# Patient Record
Sex: FEMALE | Race: WHITE | Employment: OTHER | URBAN - METROPOLITAN AREA
[De-identification: names, ages, dates, MRNs, and addresses within clinical notes are randomized per-mention and may not be internally consistent; named-entity substitution may affect disease eponyms.]

---

## 2017-01-22 PROBLEM — R07.9 CHEST PAIN: Status: ACTIVE | Noted: 2017-01-22

## 2017-01-22 PROBLEM — E87.6 HYPOKALEMIA: Status: ACTIVE | Noted: 2017-01-22

## 2017-01-22 PROBLEM — I44.2 COMPLETE HEART BLOCK (HCC): Status: ACTIVE | Noted: 2017-01-22

## 2017-05-09 PROBLEM — R07.9 CHEST PAIN: Status: RESOLVED | Noted: 2017-01-22 | Resolved: 2017-05-09

## 2017-05-09 PROBLEM — E87.6 HYPOKALEMIA: Status: RESOLVED | Noted: 2017-01-22 | Resolved: 2017-05-09

## 2017-08-24 PROBLEM — Z95.0 PACEMAKER: Status: ACTIVE | Noted: 2017-08-24

## 2017-12-07 PROBLEM — Z95.0 PACEMAKER-DEPENDENT DUE TO NATIVE CARDIAC RHYTHM INSUFFICIENT TO SUPPORT LIFE: Status: ACTIVE | Noted: 2017-12-07

## 2017-12-07 PROBLEM — I49.8 PACEMAKER-DEPENDENT DUE TO NATIVE CARDIAC RHYTHM INSUFFICIENT TO SUPPORT LIFE: Status: ACTIVE | Noted: 2017-12-07

## 2018-03-14 ENCOUNTER — NURSE ONLY (OUTPATIENT)
Dept: NON INVASIVE DIAGNOSTICS | Age: 78
End: 2018-03-14
Payer: COMMERCIAL

## 2018-03-14 DIAGNOSIS — I97.89 POSTOPERATIVE ATRIAL FIBRILLATION (HCC): ICD-10-CM

## 2018-03-14 DIAGNOSIS — Z95.0 PACEMAKER: Primary | ICD-10-CM

## 2018-03-14 DIAGNOSIS — I48.91 POSTOPERATIVE ATRIAL FIBRILLATION (HCC): ICD-10-CM

## 2018-03-14 DIAGNOSIS — I44.30 POSTOPERATIVE AV BLOCK: ICD-10-CM

## 2018-03-14 DIAGNOSIS — I97.89 POSTOPERATIVE AV BLOCK: ICD-10-CM

## 2018-03-14 PROCEDURE — 93296 REM INTERROG EVL PM/IDS: CPT | Performed by: INTERNAL MEDICINE

## 2018-03-14 PROCEDURE — 93294 REM INTERROG EVL PM/LDLS PM: CPT | Performed by: INTERNAL MEDICINE

## 2018-03-27 ENCOUNTER — TELEPHONE (OUTPATIENT)
Dept: NON INVASIVE DIAGNOSTICS | Age: 78
End: 2018-03-27

## 2018-03-27 NOTE — TELEPHONE ENCOUNTER
----- Message from Angelica Don RN sent at 3/27/2018 10:43 AM EDT -----  Successful transmission received. Please call patient and give next appointment.

## 2018-04-17 ENCOUNTER — TELEPHONE (OUTPATIENT)
Dept: CARDIOLOGY CLINIC | Age: 78
End: 2018-04-17

## 2018-07-05 ENCOUNTER — HOSPITAL ENCOUNTER (OUTPATIENT)
Age: 78
Discharge: HOME | End: 2018-07-05
Payer: COMMERCIAL

## 2018-07-05 LAB
ALBUMIN SERPL-MCNC: 4.1 G/DL (ref 3.5–5.2)
ALP BLD-CCNC: 83 U/L (ref 35–104)
ALT SERPL-CCNC: 23 U/L (ref 0–32)
ANION GAP SERPL CALCULATED.3IONS-SCNC: 12 MMOL/L (ref 7–16)
AST SERPL-CCNC: 24 U/L (ref 0–31)
BASOPHILS ABSOLUTE: 0.04 E9/L (ref 0–0.2)
BASOPHILS RELATIVE PERCENT: 0.5 % (ref 0–2)
BILIRUB SERPL-MCNC: 0.5 MG/DL (ref 0–1.2)
BUN BLDV-MCNC: 28 MG/DL (ref 8–23)
CALCIUM SERPL-MCNC: 9 MG/DL (ref 8.6–10.2)
CHLORIDE BLD-SCNC: 100 MMOL/L (ref 98–107)
CHOLESTEROL, FASTING: 167 MG/DL (ref 0–199)
CO2: 30 MMOL/L (ref 22–29)
CREAT SERPL-MCNC: 1 MG/DL (ref 0.5–1)
EOSINOPHILS ABSOLUTE: 0.33 E9/L (ref 0.05–0.5)
EOSINOPHILS RELATIVE PERCENT: 3.9 % (ref 0–6)
GFR AFRICAN AMERICAN: >60
GFR NON-AFRICAN AMERICAN: 54 ML/MIN/1.73
GLUCOSE FASTING: 88 MG/DL (ref 74–109)
HBA1C MFR BLD: 6.9 % (ref 4–5.6)
HCT VFR BLD CALC: 41 % (ref 34–48)
HDLC SERPL-MCNC: 50 MG/DL
HEMOGLOBIN: 14 G/DL (ref 11.5–15.5)
IMMATURE GRANULOCYTES #: 0.01 E9/L
IMMATURE GRANULOCYTES %: 0.1 % (ref 0–5)
LDL CHOLESTEROL CALCULATED: 97 MG/DL (ref 0–99)
LYMPHOCYTES ABSOLUTE: 2.41 E9/L (ref 1.5–4)
LYMPHOCYTES RELATIVE PERCENT: 28.6 % (ref 20–42)
MCH RBC QN AUTO: 29.8 PG (ref 26–35)
MCHC RBC AUTO-ENTMCNC: 34.1 % (ref 32–34.5)
MCV RBC AUTO: 87.2 FL (ref 80–99.9)
MONOCYTES ABSOLUTE: 0.57 E9/L (ref 0.1–0.95)
MONOCYTES RELATIVE PERCENT: 6.8 % (ref 2–12)
NEUTROPHILS ABSOLUTE: 5.08 E9/L (ref 1.8–7.3)
NEUTROPHILS RELATIVE PERCENT: 60.1 % (ref 43–80)
PDW BLD-RTO: 12.6 FL (ref 11.5–15)
PLATELET # BLD: 166 E9/L (ref 130–450)
PMV BLD AUTO: 9.4 FL (ref 7–12)
POTASSIUM SERPL-SCNC: 3.6 MMOL/L (ref 3.5–5)
RBC # BLD: 4.7 E12/L (ref 3.5–5.5)
SODIUM BLD-SCNC: 142 MMOL/L (ref 132–146)
TOTAL PROTEIN: 6.9 G/DL (ref 6.4–8.3)
TRIGLYCERIDE, FASTING: 101 MG/DL (ref 0–149)
TSH SERPL DL<=0.05 MIU/L-ACNC: 0.71 UIU/ML (ref 0.27–4.2)
VITAMIN D 25-HYDROXY: 30 NG/ML (ref 30–100)
VLDLC SERPL CALC-MCNC: 20 MG/DL
WBC # BLD: 8.4 E9/L (ref 4.5–11.5)

## 2018-07-05 PROCEDURE — 80053 COMPREHEN METABOLIC PANEL: CPT

## 2018-07-05 PROCEDURE — 84443 ASSAY THYROID STIM HORMONE: CPT

## 2018-07-05 PROCEDURE — 82306 VITAMIN D 25 HYDROXY: CPT

## 2018-07-05 PROCEDURE — 83036 HEMOGLOBIN GLYCOSYLATED A1C: CPT

## 2018-07-05 PROCEDURE — 85025 COMPLETE CBC W/AUTO DIFF WBC: CPT

## 2018-07-05 PROCEDURE — 36415 COLL VENOUS BLD VENIPUNCTURE: CPT

## 2018-07-05 PROCEDURE — 80061 LIPID PANEL: CPT

## 2018-07-16 ENCOUNTER — TELEPHONE (OUTPATIENT)
Dept: NON INVASIVE DIAGNOSTICS | Age: 78
End: 2018-07-16

## 2018-07-20 ENCOUNTER — NURSE ONLY (OUTPATIENT)
Dept: NON INVASIVE DIAGNOSTICS | Age: 78
End: 2018-07-20
Payer: COMMERCIAL

## 2018-07-20 DIAGNOSIS — Z95.0 PACEMAKER: Primary | ICD-10-CM

## 2018-07-20 DIAGNOSIS — I44.2 COMPLETE HEART BLOCK (HCC): ICD-10-CM

## 2018-07-20 DIAGNOSIS — I48.0 PAROXYSMAL ATRIAL FIBRILLATION (HCC): ICD-10-CM

## 2018-07-20 PROCEDURE — 93296 REM INTERROG EVL PM/IDS: CPT | Performed by: INTERNAL MEDICINE

## 2018-07-20 PROCEDURE — 93294 REM INTERROG EVL PM/LDLS PM: CPT | Performed by: INTERNAL MEDICINE

## 2018-07-20 NOTE — TELEPHONE ENCOUNTER
Patient called questioning if her transmission came through. Instructed successful latitude transmission. Patient instructed to keep monitor plugged in. Patient voiced understanding.      Gilberto Salgado

## 2018-07-20 NOTE — PROGRESS NOTES
I have personally reviewed the remote pacemaker data. Stable battery and lead parameters.   - No clinically significant arrhythmias noted  - continue follow up as recommended    Marietta Monk MD, [address]  [facility]  [facility] [place]: [facility]  4:02 PM  7/20/2018

## 2018-09-20 ENCOUNTER — OFFICE VISIT (OUTPATIENT)
Dept: CARDIOLOGY CLINIC | Age: 78
End: 2018-09-20
Payer: COMMERCIAL

## 2018-09-20 VITALS
HEART RATE: 64 BPM | WEIGHT: 193.8 LBS | BODY MASS INDEX: 35.66 KG/M2 | RESPIRATION RATE: 16 BRPM | DIASTOLIC BLOOD PRESSURE: 72 MMHG | SYSTOLIC BLOOD PRESSURE: 122 MMHG | HEIGHT: 62 IN

## 2018-09-20 DIAGNOSIS — I10 ESSENTIAL HYPERTENSION: Primary | Chronic | ICD-10-CM

## 2018-09-20 PROCEDURE — 99214 OFFICE O/P EST MOD 30 MIN: CPT | Performed by: INTERNAL MEDICINE

## 2018-09-20 PROCEDURE — 93000 ELECTROCARDIOGRAM COMPLETE: CPT | Performed by: INTERNAL MEDICINE

## 2018-09-24 RX ORDER — METOPROLOL SUCCINATE 50 MG/1
TABLET, EXTENDED RELEASE ORAL
Qty: 180 TABLET | Refills: 1 | Status: SHIPPED | OUTPATIENT
Start: 2018-09-24 | End: 2019-03-20 | Stop reason: SDUPTHER

## 2018-10-24 ENCOUNTER — NURSE ONLY (OUTPATIENT)
Dept: NON INVASIVE DIAGNOSTICS | Age: 78
End: 2018-10-24
Payer: COMMERCIAL

## 2018-10-24 DIAGNOSIS — Z95.0 PACEMAKER: Primary | ICD-10-CM

## 2018-10-24 DIAGNOSIS — I44.2 COMPLETE HEART BLOCK (HCC): ICD-10-CM

## 2018-10-24 PROCEDURE — 93296 REM INTERROG EVL PM/IDS: CPT | Performed by: INTERNAL MEDICINE

## 2018-10-24 PROCEDURE — 93294 REM INTERROG EVL PM/LDLS PM: CPT | Performed by: INTERNAL MEDICINE

## 2018-10-29 ENCOUNTER — TELEPHONE (OUTPATIENT)
Dept: ADMINISTRATIVE | Age: 78
End: 2018-10-29

## 2018-10-29 ENCOUNTER — TELEPHONE (OUTPATIENT)
Dept: NON INVASIVE DIAGNOSTICS | Age: 78
End: 2018-10-29

## 2018-10-29 NOTE — TELEPHONE ENCOUNTER
Pt notified of remote transmission receipt and of next apt with Dr. Sherita Qureshi of: 12/18/18 @ 1:15 for a yearly OV with Dr. Sherita Qureshi. She states understanding.

## 2018-10-29 NOTE — TELEPHONE ENCOUNTER
----- Message from Mai Catalan RN sent at 10/26/2018  3:56 PM EDT -----  Successful transmission received. Please call patient and give next appointment.

## 2018-11-16 ENCOUNTER — HOSPITAL ENCOUNTER (OUTPATIENT)
Age: 78
Discharge: HOME | End: 2018-11-16
Payer: COMMERCIAL

## 2018-11-16 LAB
ALBUMIN SERPL-MCNC: 4.1 G/DL (ref 3.5–5.2)
ALP BLD-CCNC: 90 U/L (ref 35–104)
ALT SERPL-CCNC: 26 U/L (ref 0–32)
ANION GAP SERPL CALCULATED.3IONS-SCNC: 14 MMOL/L (ref 7–16)
AST SERPL-CCNC: 22 U/L (ref 0–31)
BASOPHILS ABSOLUTE: 0 E9/L (ref 0–0.2)
BASOPHILS RELATIVE PERCENT: 0 % (ref 0–2)
BILIRUB SERPL-MCNC: 0.4 MG/DL (ref 0–1.2)
BUN BLDV-MCNC: 21 MG/DL (ref 8–23)
CALCIUM SERPL-MCNC: 9.4 MG/DL (ref 8.6–10.2)
CHLORIDE BLD-SCNC: 101 MMOL/L (ref 98–107)
CHOLESTEROL, TOTAL: 194 MG/DL (ref 0–199)
CO2: 31 MMOL/L (ref 22–29)
CREAT SERPL-MCNC: 1 MG/DL (ref 0.5–1)
EOSINOPHILS ABSOLUTE: 0.25 E9/L (ref 0.05–0.5)
EOSINOPHILS RELATIVE PERCENT: 4.3 % (ref 0–6)
GFR AFRICAN AMERICAN: >60
GFR NON-AFRICAN AMERICAN: 54 ML/MIN/1.73
GLUCOSE BLD-MCNC: 129 MG/DL (ref 74–99)
HBA1C MFR BLD: 7.3 % (ref 4–5.6)
HCT VFR BLD CALC: 42.1 % (ref 34–48)
HDLC SERPL-MCNC: 49 MG/DL
HEMOGLOBIN: 14 G/DL (ref 11.5–15.5)
IMMATURE GRANULOCYTES #: 0.01 E9/L
IMMATURE GRANULOCYTES %: 0.2 % (ref 0–5)
LDL CHOLESTEROL CALCULATED: 117 MG/DL (ref 0–99)
LYMPHOCYTES ABSOLUTE: 1.74 E9/L (ref 1.5–4)
LYMPHOCYTES RELATIVE PERCENT: 29.6 % (ref 20–42)
MCH RBC QN AUTO: 29.2 PG (ref 26–35)
MCHC RBC AUTO-ENTMCNC: 33.3 % (ref 32–34.5)
MCV RBC AUTO: 87.9 FL (ref 80–99.9)
MONOCYTES ABSOLUTE: 0.38 E9/L (ref 0.1–0.95)
MONOCYTES RELATIVE PERCENT: 6.5 % (ref 2–12)
NEUTROPHILS ABSOLUTE: 3.5 E9/L (ref 1.8–7.3)
NEUTROPHILS RELATIVE PERCENT: 59.4 % (ref 43–80)
PDW BLD-RTO: 12.6 FL (ref 11.5–15)
PLATELET # BLD: 156 E9/L (ref 130–450)
PMV BLD AUTO: 9.3 FL (ref 7–12)
POTASSIUM SERPL-SCNC: 4 MMOL/L (ref 3.5–5)
RBC # BLD: 4.79 E12/L (ref 3.5–5.5)
SODIUM BLD-SCNC: 146 MMOL/L (ref 132–146)
TOTAL PROTEIN: 7 G/DL (ref 6.4–8.3)
TRIGL SERPL-MCNC: 141 MG/DL (ref 0–149)
TSH SERPL DL<=0.05 MIU/L-ACNC: 3.25 UIU/ML (ref 0.27–4.2)
VLDLC SERPL CALC-MCNC: 28 MG/DL
WBC # BLD: 5.9 E9/L (ref 4.5–11.5)

## 2018-11-16 PROCEDURE — 36415 COLL VENOUS BLD VENIPUNCTURE: CPT

## 2018-11-16 PROCEDURE — 84443 ASSAY THYROID STIM HORMONE: CPT

## 2018-11-16 PROCEDURE — 83036 HEMOGLOBIN GLYCOSYLATED A1C: CPT

## 2018-11-16 PROCEDURE — 80053 COMPREHEN METABOLIC PANEL: CPT

## 2018-11-16 PROCEDURE — 85025 COMPLETE CBC W/AUTO DIFF WBC: CPT

## 2018-11-16 PROCEDURE — 80061 LIPID PANEL: CPT

## 2018-12-18 ENCOUNTER — OFFICE VISIT (OUTPATIENT)
Dept: NON INVASIVE DIAGNOSTICS | Age: 78
End: 2018-12-18
Payer: COMMERCIAL

## 2018-12-18 VITALS
WEIGHT: 196 LBS | HEIGHT: 62 IN | RESPIRATION RATE: 18 BRPM | HEART RATE: 79 BPM | DIASTOLIC BLOOD PRESSURE: 80 MMHG | SYSTOLIC BLOOD PRESSURE: 124 MMHG | BODY MASS INDEX: 36.07 KG/M2

## 2018-12-18 DIAGNOSIS — G47.33 OSA ON CPAP: ICD-10-CM

## 2018-12-18 DIAGNOSIS — I49.8 PACEMAKER-DEPENDENT DUE TO NATIVE CARDIAC RHYTHM INSUFFICIENT TO SUPPORT LIFE: ICD-10-CM

## 2018-12-18 DIAGNOSIS — Z99.89 OSA ON CPAP: ICD-10-CM

## 2018-12-18 DIAGNOSIS — I44.2 COMPLETE HEART BLOCK (HCC): ICD-10-CM

## 2018-12-18 DIAGNOSIS — Z95.0 PACEMAKER-DEPENDENT DUE TO NATIVE CARDIAC RHYTHM INSUFFICIENT TO SUPPORT LIFE: ICD-10-CM

## 2018-12-18 DIAGNOSIS — Z95.0 PACEMAKER: Primary | ICD-10-CM

## 2018-12-18 PROCEDURE — 93280 PM DEVICE PROGR EVAL DUAL: CPT | Performed by: INTERNAL MEDICINE

## 2018-12-18 PROCEDURE — 99212 OFFICE O/P EST SF 10 MIN: CPT | Performed by: INTERNAL MEDICINE

## 2019-01-23 ENCOUNTER — NURSE ONLY (OUTPATIENT)
Dept: NON INVASIVE DIAGNOSTICS | Age: 79
End: 2019-01-23
Payer: COMMERCIAL

## 2019-01-23 DIAGNOSIS — I44.2 COMPLETE HEART BLOCK (HCC): ICD-10-CM

## 2019-01-23 DIAGNOSIS — I48.0 PAROXYSMAL ATRIAL FIBRILLATION (HCC): ICD-10-CM

## 2019-01-23 DIAGNOSIS — Z95.0 PACEMAKER: Primary | ICD-10-CM

## 2019-01-23 PROCEDURE — 93296 REM INTERROG EVL PM/IDS: CPT | Performed by: INTERNAL MEDICINE

## 2019-01-23 PROCEDURE — 93294 REM INTERROG EVL PM/LDLS PM: CPT | Performed by: INTERNAL MEDICINE

## 2019-02-22 ENCOUNTER — TELEPHONE (OUTPATIENT)
Dept: ADMINISTRATIVE | Age: 79
End: 2019-02-22

## 2019-02-28 RX ORDER — ISOSORBIDE MONONITRATE 30 MG/1
TABLET, EXTENDED RELEASE ORAL
Qty: 90 TABLET | Refills: 3 | Status: SHIPPED
Start: 2019-02-28 | End: 2020-03-02

## 2019-03-20 RX ORDER — METOPROLOL SUCCINATE 50 MG/1
TABLET, EXTENDED RELEASE ORAL
Qty: 180 TABLET | Refills: 3 | Status: SHIPPED
Start: 2019-03-20 | End: 2020-03-13

## 2019-03-28 ENCOUNTER — OFFICE VISIT (OUTPATIENT)
Dept: CARDIOLOGY CLINIC | Age: 79
End: 2019-03-28
Payer: COMMERCIAL

## 2019-03-28 VITALS
DIASTOLIC BLOOD PRESSURE: 70 MMHG | WEIGHT: 196.2 LBS | HEIGHT: 62 IN | HEART RATE: 64 BPM | SYSTOLIC BLOOD PRESSURE: 110 MMHG | BODY MASS INDEX: 36.1 KG/M2 | RESPIRATION RATE: 18 BRPM

## 2019-03-28 DIAGNOSIS — Z01.810 PREOP CARDIOVASCULAR EXAM: ICD-10-CM

## 2019-03-28 DIAGNOSIS — I25.119 CORONARY ARTERY DISEASE INVOLVING NATIVE CORONARY ARTERY OF NATIVE HEART WITH ANGINA PECTORIS (HCC): Primary | Chronic | ICD-10-CM

## 2019-03-28 PROCEDURE — 99214 OFFICE O/P EST MOD 30 MIN: CPT | Performed by: INTERNAL MEDICINE

## 2019-03-28 PROCEDURE — 93000 ELECTROCARDIOGRAM COMPLETE: CPT | Performed by: INTERNAL MEDICINE

## 2019-04-02 ENCOUNTER — HOSPITAL ENCOUNTER (OUTPATIENT)
Dept: CARDIOLOGY | Age: 79
Discharge: HOME | End: 2019-04-02
Payer: COMMERCIAL

## 2019-04-02 VITALS
HEART RATE: 82 BPM | SYSTOLIC BLOOD PRESSURE: 124 MMHG | WEIGHT: 196 LBS | BODY MASS INDEX: 36.07 KG/M2 | DIASTOLIC BLOOD PRESSURE: 70 MMHG | HEIGHT: 62 IN

## 2019-04-02 DIAGNOSIS — Z01.810 PREOP CARDIOVASCULAR EXAM: ICD-10-CM

## 2019-04-02 DIAGNOSIS — I25.119 CORONARY ARTERY DISEASE INVOLVING NATIVE CORONARY ARTERY OF NATIVE HEART WITH ANGINA PECTORIS (HCC): Primary | Chronic | ICD-10-CM

## 2019-04-02 PROCEDURE — 3430000000 HC RX DIAGNOSTIC RADIOPHARMACEUTICAL: Performed by: INTERNAL MEDICINE

## 2019-04-02 PROCEDURE — 78452 HT MUSCLE IMAGE SPECT MULT: CPT

## 2019-04-02 PROCEDURE — 6360000002 HC RX W HCPCS: Performed by: INTERNAL MEDICINE

## 2019-04-02 PROCEDURE — 93017 CV STRESS TEST TRACING ONLY: CPT

## 2019-04-02 PROCEDURE — 2580000003 HC RX 258: Performed by: INTERNAL MEDICINE

## 2019-04-02 PROCEDURE — A9500 TC99M SESTAMIBI: HCPCS | Performed by: INTERNAL MEDICINE

## 2019-04-02 RX ORDER — SODIUM CHLORIDE 0.9 % (FLUSH) 0.9 %
10 SYRINGE (ML) INJECTION PRN
Status: DISCONTINUED | OUTPATIENT
Start: 2019-04-02 | End: 2019-04-03 | Stop reason: HOSPADM

## 2019-04-02 RX ADMIN — Medication 10 ML: at 10:03

## 2019-04-02 RX ADMIN — Medication 10 ML: at 10:04

## 2019-04-02 RX ADMIN — Medication 10 ML: at 08:55

## 2019-04-02 RX ADMIN — Medication 32.6 MILLICURIE: at 10:03

## 2019-04-02 RX ADMIN — REGADENOSON 0.4 MG: 0.08 INJECTION, SOLUTION INTRAVENOUS at 10:03

## 2019-04-02 RX ADMIN — Medication 10.1 MILLICURIE: at 08:55

## 2019-04-02 NOTE — PROCEDURES
[address] [facility] [address] [phone]                Pharmacologic Stress Nuclear Gated SPECT Study    Name: Gamal Carty Account Number: [de-identified]    :  1940          Sex: female         Date of Study:  2019    Height: 5' 2\" (157.5 cm)         Weight: 196 lb (88.9 kg)     Ordering Provider: Noah Haney DO          PCP: Edy Grace MD      Cardiologist: Noah Haney DO            Interpreting Physician: Bob Rachel MD      Indication:   Preoperative Risk Stratification    Clinical History:   Patient has prior history of coronary artery disease. Resting ECG:    SC int .22 sec, QRS int .14 sec, QT int . 40 sec; HR 75 bpm  Normal sinus rhythm and Left Bundle Branch Block    Procedure:   Pharmacologic stress testing was performed with regadenoson 0.4 mg for 15 seconds. Additionally, low-level exercise was performed along with the infusion. The heart rate was 75 at baseline and donnie to 108 beats during the infusion. This corresponds to 75% of maximum predicted heart rate. The blood pressure at baseline was 120/70 and blood pressure at the end of infusion was 118/70. Blood pressure response was normal during the stress procedure. The patient experienced weird sensation and headache during the infusion. ECG during the infusion did not change. IMAGING: Myocardial perfusion imaging was performed at rest 30-35 minutes following the intravenous injection of 10.1 mCi of (Tc-Sestamibi) followed by 10 ml of Normal Saline. As per infusion protocol, the patient was injected intravenously with 32.6 mCi of (Tc-Sestamibi) followed by 10 ml of Normal Saline. Gated post-stress tomographic imaging was performed 20-25 minutes after stress. FINDINGS: The overall quality of the study was good.      Left ventricular cavity size was noted to be normal.    Rotational analog analysis demonstrated no patient motion or abnormal extracardiac radioactivity. A severe defect was present in the apical anterior wall(s) that was  small sized by quantification. The resting images show no change. Gated SPECT left ventricular ejection fraction was calculated to be 77%, with hypokinesis of the apical anterior wall. Impression:    1. ECG during the infusion did not change. 2. The myocardial perfusion imaging was abnormal.  The abnormality was a a small sized fixed defect in the apical anterior wall suggestive of a prior MI  3. Overall left ventricular systolic function was abnormal with regional wall motion abnormalities. 4. Low risk pre-operative pharmacologic stress test.    Thank you for sending your patient to this [employer] [facility].      Electronically signed by Shai Hall MD on 4/2/19 at 11:46 AM

## 2019-04-03 ENCOUNTER — HOSPITAL ENCOUNTER (OUTPATIENT)
Age: 79
Discharge: HOME | End: 2019-04-03
Payer: COMMERCIAL

## 2019-04-03 LAB
ALBUMIN SERPL-MCNC: 4.3 G/DL (ref 3.5–5.2)
ALP BLD-CCNC: 104 U/L (ref 35–104)
ALT SERPL-CCNC: 25 U/L (ref 0–32)
ANION GAP SERPL CALCULATED.3IONS-SCNC: 13 MMOL/L (ref 7–16)
AST SERPL-CCNC: 21 U/L (ref 0–31)
BASOPHILS ABSOLUTE: 0 E9/L (ref 0–0.2)
BASOPHILS RELATIVE PERCENT: 0 % (ref 0–2)
BILIRUB SERPL-MCNC: 0.5 MG/DL (ref 0–1.2)
BUN BLDV-MCNC: 23 MG/DL (ref 8–23)
CALCIUM SERPL-MCNC: 9 MG/DL (ref 8.6–10.2)
CHLORIDE BLD-SCNC: 101 MMOL/L (ref 98–107)
CHOLESTEROL, TOTAL: 165 MG/DL (ref 0–199)
CO2: 30 MMOL/L (ref 22–29)
CREAT SERPL-MCNC: 0.9 MG/DL (ref 0.5–1)
EOSINOPHILS ABSOLUTE: 0.24 E9/L (ref 0.05–0.5)
EOSINOPHILS RELATIVE PERCENT: 3.8 % (ref 0–6)
GFR AFRICAN AMERICAN: >60
GFR NON-AFRICAN AMERICAN: >60 ML/MIN/1.73
GLUCOSE BLD-MCNC: 143 MG/DL (ref 74–99)
HBA1C MFR BLD: 7.3 % (ref 4–5.6)
HCT VFR BLD CALC: 42.4 % (ref 34–48)
HDLC SERPL-MCNC: 54 MG/DL
HEMOGLOBIN: 14.3 G/DL (ref 11.5–15.5)
IMMATURE GRANULOCYTES #: 0.01 E9/L
IMMATURE GRANULOCYTES %: 0.2 % (ref 0–5)
LDL CHOLESTEROL CALCULATED: 88 MG/DL (ref 0–99)
LYMPHOCYTES ABSOLUTE: 1.87 E9/L (ref 1.5–4)
LYMPHOCYTES RELATIVE PERCENT: 29.7 % (ref 20–42)
MCH RBC QN AUTO: 29.7 PG (ref 26–35)
MCHC RBC AUTO-ENTMCNC: 33.7 % (ref 32–34.5)
MCV RBC AUTO: 88 FL (ref 80–99.9)
MONOCYTES ABSOLUTE: 0.46 E9/L (ref 0.1–0.95)
MONOCYTES RELATIVE PERCENT: 7.3 % (ref 2–12)
NEUTROPHILS ABSOLUTE: 3.72 E9/L (ref 1.8–7.3)
NEUTROPHILS RELATIVE PERCENT: 59 % (ref 43–80)
PDW BLD-RTO: 12.5 FL (ref 11.5–15)
PLATELET # BLD: 158 E9/L (ref 130–450)
PMV BLD AUTO: 9.5 FL (ref 7–12)
POTASSIUM SERPL-SCNC: 3.8 MMOL/L (ref 3.5–5)
RBC # BLD: 4.82 E12/L (ref 3.5–5.5)
SODIUM BLD-SCNC: 144 MMOL/L (ref 132–146)
TOTAL PROTEIN: 7.2 G/DL (ref 6.4–8.3)
TRIGL SERPL-MCNC: 114 MG/DL (ref 0–149)
TSH SERPL DL<=0.05 MIU/L-ACNC: 1.83 UIU/ML (ref 0.27–4.2)
VLDLC SERPL CALC-MCNC: 23 MG/DL
WBC # BLD: 6.3 E9/L (ref 4.5–11.5)

## 2019-04-03 PROCEDURE — 36415 COLL VENOUS BLD VENIPUNCTURE: CPT

## 2019-04-03 PROCEDURE — 85025 COMPLETE CBC W/AUTO DIFF WBC: CPT

## 2019-04-03 PROCEDURE — 80061 LIPID PANEL: CPT

## 2019-04-03 PROCEDURE — 83036 HEMOGLOBIN GLYCOSYLATED A1C: CPT

## 2019-04-03 PROCEDURE — 84443 ASSAY THYROID STIM HORMONE: CPT

## 2019-04-03 PROCEDURE — 80053 COMPREHEN METABOLIC PANEL: CPT

## 2019-04-10 ENCOUNTER — TELEPHONE (OUTPATIENT)
Dept: CARDIOLOGY CLINIC | Age: 79
End: 2019-04-10

## 2019-04-10 NOTE — TELEPHONE ENCOUNTER
----- Message from Noe Valdovinos DO sent at 4/10/2019 12:04 PM EDT -----  Low risk stress. Noe Valdovinos D.O.   Cardiologist  Cardiology, [facility]

## 2019-04-10 NOTE — TELEPHONE ENCOUNTER
Contacted patient with stress results per Dr. Johnathon Tijerina. She acknowledged understanding. Forwarded amended note to Dr. Henrietta Flores and Dr. Ara Merritt.

## 2019-04-11 ENCOUNTER — TELEPHONE (OUTPATIENT)
Dept: NON INVASIVE DIAGNOSTICS | Age: 79
End: 2019-04-11

## 2019-04-11 NOTE — TELEPHONE ENCOUNTER
Spoke with the patient to follow up since Dr Earl Ramachandran is no longer here. Patient stated that she is feeling good and having no issues right now. Told the patient that we are going to follow all EP Medications, and devices if the patient has one. Next remote transmission is 04/25/2019. Patient's cardiologist is Dr Pauline Hardwick. Told the patient to call us if they have any urgent matters. Verbalized understanding.

## 2019-04-25 ENCOUNTER — NURSE ONLY (OUTPATIENT)
Dept: NON INVASIVE DIAGNOSTICS | Age: 79
End: 2019-04-25
Payer: COMMERCIAL

## 2019-04-25 DIAGNOSIS — Z95.0 PACEMAKER: Primary | ICD-10-CM

## 2019-04-25 DIAGNOSIS — I44.2 COMPLETE HEART BLOCK (HCC): ICD-10-CM

## 2019-04-25 DIAGNOSIS — I48.0 PAROXYSMAL ATRIAL FIBRILLATION (HCC): ICD-10-CM

## 2019-04-25 PROCEDURE — 93296 REM INTERROG EVL PM/IDS: CPT | Performed by: INTERNAL MEDICINE

## 2019-04-25 PROCEDURE — 93294 REM INTERROG EVL PM/LDLS PM: CPT | Performed by: INTERNAL MEDICINE

## 2019-05-02 NOTE — PROGRESS NOTES
See [employer] [place] report. Remote monitoring reviewed over a 90 day period. End of 90 day monitoring period date of service 4.25.2019.

## 2019-07-31 ENCOUNTER — NURSE ONLY (OUTPATIENT)
Dept: NON INVASIVE DIAGNOSTICS | Age: 79
End: 2019-07-31
Payer: COMMERCIAL

## 2019-07-31 ENCOUNTER — OFFICE VISIT (OUTPATIENT)
Dept: CARDIOLOGY CLINIC | Age: 79
End: 2019-07-31
Payer: COMMERCIAL

## 2019-07-31 VITALS
RESPIRATION RATE: 16 BRPM | HEART RATE: 71 BPM | DIASTOLIC BLOOD PRESSURE: 80 MMHG | WEIGHT: 197.8 LBS | HEIGHT: 62 IN | BODY MASS INDEX: 36.4 KG/M2 | SYSTOLIC BLOOD PRESSURE: 124 MMHG

## 2019-07-31 DIAGNOSIS — I44.2 COMPLETE HEART BLOCK (HCC): ICD-10-CM

## 2019-07-31 DIAGNOSIS — Z95.0 PACEMAKER: Primary | ICD-10-CM

## 2019-07-31 DIAGNOSIS — I25.119 CORONARY ARTERY DISEASE INVOLVING NATIVE CORONARY ARTERY OF NATIVE HEART WITH ANGINA PECTORIS (HCC): Primary | ICD-10-CM

## 2019-07-31 DIAGNOSIS — I48.0 PAROXYSMAL ATRIAL FIBRILLATION (HCC): ICD-10-CM

## 2019-07-31 PROCEDURE — 93000 ELECTROCARDIOGRAM COMPLETE: CPT | Performed by: INTERNAL MEDICINE

## 2019-07-31 PROCEDURE — 99214 OFFICE O/P EST MOD 30 MIN: CPT | Performed by: INTERNAL MEDICINE

## 2019-07-31 PROCEDURE — 93280 PM DEVICE PROGR EVAL DUAL: CPT | Performed by: INTERNAL MEDICINE

## 2019-07-31 NOTE — PROGRESS NOTES
CHIEF COMPLAINT: Chest pain/CAD-CABG    HPI: Patient is a 70 y.o.  seen at the request of Rashmi Parsons MD.      Patient seen in follow up. Patient was seen for chest pain which resulted in a cath showing multivessel CAD. She underwent CABG 84/2/50 but had a complicated post-op course due to a wound infection. Patient admitted 1/17 for PAFlutter. Reverted to sinus. Started on Eliquis. No CP or SOB.      Past Medical History:   Diagnosis Date    Arthritis     Asthma     Atrial fibrillation ([address] 2016    Atrial flutter (HCC)     CAD (coronary artery disease)     CHB (complete heart block) ([address]     Diabetes mellitus ([address]     Hyperlipidemia     no med    Hypertension     LBBB (left bundle branch block)     Sleep apnea     Cpap setting 10    Symptomatic bradycardia     Thyroid disease        Patient Active Problem List   Diagnosis    FLORECITA on CPAP    Hypersomnolence    Diabetes mellitus ([address]    Essential hypertension    Sleep apnea    Coronary artery disease involving native coronary artery of native heart with angina pectoris ([address]    Hypothyroid    Asthma    Hyperlipidemia    H/O CABG x 4 (10-8-15: Dr. Solis Conn -> LAD, Free CHRISTINA -> D2, SVG -> OM3, SVG -> RCA)    Postoperative AV block    Postoperative atrial fibrillation (HCC)    Status post cardiac pacemaker placement (1-23-16: Dr. Debbie Moeller)   [facility] Non morbid obesity due to excess calories    Type 1 diabetes mellitus without complication ([address]    Surgical (sternal) wound infection (Oct. 2015)    Surgical (sternal) wound, non healing (Oct. 2015) - 11-17-15:  Excisional debridment, L pectoralis advancement flap w complex closure Balbir Mendoza MD)    Colitis    SIRS (systemic inflammatory response syndrome) (HCC)    Paroxysmal atrial fibrillation (HCC) - on apixiban    Complete heart block ([address]    Pacemaker    Pacemaker-dependent due to native cardiac rhythm insufficient to support life       Allergies   Allergen Reactions    AND PLAN:  Patient Active Problem List   Diagnosis    FLORECITA on CPAP    Hypersomnolence    Diabetes mellitus ([address]    Essential hypertension    Sleep apnea    Coronary artery disease involving native coronary artery of native heart with angina pectoris ([facility]    Hypothyroid    Asthma    Hyperlipidemia    H/O CABG x 4 (10-8-15: Dr. Sarah Mantilla -> LAD, Free CHRISTINA -> D2, SVG -> OM3, SVG -> RCA)    Postoperative AV block    Postoperative atrial fibrillation (HCC)    Status post cardiac pacemaker placement (1-23-16: Dr. Maye Arroyo)   Chelsea He Non morbid obesity due to excess calories    Type 1 diabetes mellitus without complication ([address]    Surgical (sternal) wound infection (Oct. 2015)    Surgical (sternal) wound, non healing (Oct. 2015) - 11-17-15:  Excisional debridment, L pectoralis advancement flap w complex closure Elvira Pizano MD)    Colitis    SIRS (systemic inflammatory response syndrome) (HCC)    Paroxysmal atrial fibrillation (HCC) - on apixiban    Complete heart block ([address]    Pacemaker    Pacemaker-dependent due to native cardiac rhythm insufficient to support life     1. Chest pain/CAD/Status post CABG/Pre-op:  Exertional chest symptoms. Pharm stress low risk 4/2/19. Continue imdur and Ranexa. Rash with ASA and on Eliquis. Continue coreg/ramipril/crestor. 2. PAF:   In sinus. On BB and eliquis. 3. Myalgias: Tolerant crestor over other day. 4. HTN: Observe. 5. Pacer 1/23/17: Per EP. 6. LBBB: Chronic. 7. DM: Per PCP. Lin Clark D.O.   Cardiologist  Cardiology, [facility]

## 2019-08-02 ENCOUNTER — NURSE ONLY (OUTPATIENT)
Dept: NON INVASIVE DIAGNOSTICS | Age: 79
End: 2019-08-02
Payer: COMMERCIAL

## 2019-08-02 DIAGNOSIS — I44.2 COMPLETE HEART BLOCK (HCC): ICD-10-CM

## 2019-08-02 DIAGNOSIS — Z95.0 PACEMAKER: Primary | ICD-10-CM

## 2019-08-02 DIAGNOSIS — I48.0 PAROXYSMAL ATRIAL FIBRILLATION (HCC): ICD-10-CM

## 2019-08-02 PROCEDURE — 93294 REM INTERROG EVL PM/LDLS PM: CPT | Performed by: INTERNAL MEDICINE

## 2019-08-02 PROCEDURE — 93296 REM INTERROG EVL PM/IDS: CPT | Performed by: INTERNAL MEDICINE

## 2019-08-13 ENCOUNTER — HOSPITAL ENCOUNTER (OUTPATIENT)
Age: 79
Discharge: HOME | End: 2019-08-13
Payer: COMMERCIAL

## 2019-08-13 LAB
ALBUMIN SERPL-MCNC: 4.3 G/DL (ref 3.5–5.2)
ALP BLD-CCNC: 101 U/L (ref 35–104)
ALT SERPL-CCNC: 22 U/L (ref 0–32)
ANION GAP SERPL CALCULATED.3IONS-SCNC: 14 MMOL/L (ref 7–16)
AST SERPL-CCNC: 20 U/L (ref 0–31)
BASOPHILS ABSOLUTE: 0 E9/L (ref 0–0.2)
BASOPHILS RELATIVE PERCENT: 0 % (ref 0–2)
BILIRUB SERPL-MCNC: 0.4 MG/DL (ref 0–1.2)
BUN BLDV-MCNC: 18 MG/DL (ref 8–23)
CALCIUM SERPL-MCNC: 9.5 MG/DL (ref 8.6–10.2)
CHLORIDE BLD-SCNC: 98 MMOL/L (ref 98–107)
CHOLESTEROL, TOTAL: 159 MG/DL (ref 0–199)
CO2: 30 MMOL/L (ref 22–29)
CREAT SERPL-MCNC: 0.9 MG/DL (ref 0.5–1)
EOSINOPHILS ABSOLUTE: 0.27 E9/L (ref 0.05–0.5)
EOSINOPHILS RELATIVE PERCENT: 4.3 % (ref 0–6)
GFR AFRICAN AMERICAN: >60
GFR NON-AFRICAN AMERICAN: >60 ML/MIN/1.73
GLUCOSE BLD-MCNC: 87 MG/DL (ref 74–99)
HBA1C MFR BLD: 8.4 % (ref 4–5.6)
HCT VFR BLD CALC: 42.2 % (ref 34–48)
HDLC SERPL-MCNC: 51 MG/DL
HEMOGLOBIN: 14.3 G/DL (ref 11.5–15.5)
IMMATURE GRANULOCYTES #: 0.02 E9/L
IMMATURE GRANULOCYTES %: 0.3 % (ref 0–5)
LDL CHOLESTEROL CALCULATED: 91 MG/DL (ref 0–99)
LYMPHOCYTES ABSOLUTE: 2.04 E9/L (ref 1.5–4)
LYMPHOCYTES RELATIVE PERCENT: 32.4 % (ref 20–42)
MCH RBC QN AUTO: 29.6 PG (ref 26–35)
MCHC RBC AUTO-ENTMCNC: 33.9 % (ref 32–34.5)
MCV RBC AUTO: 87.4 FL (ref 80–99.9)
MONOCYTES ABSOLUTE: 0.55 E9/L (ref 0.1–0.95)
MONOCYTES RELATIVE PERCENT: 8.7 % (ref 2–12)
NEUTROPHILS ABSOLUTE: 3.42 E9/L (ref 1.8–7.3)
NEUTROPHILS RELATIVE PERCENT: 54.3 % (ref 43–80)
PDW BLD-RTO: 12.5 FL (ref 11.5–15)
PLATELET # BLD: 155 E9/L (ref 130–450)
PMV BLD AUTO: 9.2 FL (ref 7–12)
POTASSIUM SERPL-SCNC: 3.6 MMOL/L (ref 3.5–5)
RBC # BLD: 4.83 E12/L (ref 3.5–5.5)
SODIUM BLD-SCNC: 142 MMOL/L (ref 132–146)
TOTAL PROTEIN: 7.1 G/DL (ref 6.4–8.3)
TRIGL SERPL-MCNC: 84 MG/DL (ref 0–149)
TSH SERPL DL<=0.05 MIU/L-ACNC: 2.38 UIU/ML (ref 0.27–4.2)
VITAMIN D 25-HYDROXY: 28 NG/ML (ref 30–100)
VLDLC SERPL CALC-MCNC: 17 MG/DL
WBC # BLD: 6.3 E9/L (ref 4.5–11.5)

## 2019-08-13 PROCEDURE — 80061 LIPID PANEL: CPT

## 2019-08-13 PROCEDURE — 80053 COMPREHEN METABOLIC PANEL: CPT

## 2019-08-13 PROCEDURE — 85025 COMPLETE CBC W/AUTO DIFF WBC: CPT

## 2019-08-13 PROCEDURE — 84443 ASSAY THYROID STIM HORMONE: CPT

## 2019-08-13 PROCEDURE — 83036 HEMOGLOBIN GLYCOSYLATED A1C: CPT

## 2019-08-13 PROCEDURE — 82306 VITAMIN D 25 HYDROXY: CPT

## 2019-08-13 PROCEDURE — 36415 COLL VENOUS BLD VENIPUNCTURE: CPT

## 2019-09-03 RX ORDER — NITROGLYCERIN 0.4 MG/1
TABLET SUBLINGUAL
Qty: 25 TABLET | Refills: 3 | Status: SHIPPED
Start: 2019-09-03 | End: 2020-05-29

## 2019-09-16 RX ORDER — HYDROCHLOROTHIAZIDE 25 MG/1
TABLET ORAL
Qty: 90 TABLET | Refills: 3 | Status: SHIPPED
Start: 2019-09-16 | End: 2020-08-27 | Stop reason: SDUPTHER

## 2019-11-01 ENCOUNTER — TELEPHONE (OUTPATIENT)
Dept: NON INVASIVE DIAGNOSTICS | Age: 79
End: 2019-11-01

## 2019-11-01 ENCOUNTER — NURSE ONLY (OUTPATIENT)
Dept: NON INVASIVE DIAGNOSTICS | Age: 79
End: 2019-11-01
Payer: COMMERCIAL

## 2019-11-01 DIAGNOSIS — I48.0 PAROXYSMAL ATRIAL FIBRILLATION (HCC): ICD-10-CM

## 2019-11-01 DIAGNOSIS — I44.2 COMPLETE HEART BLOCK (HCC): ICD-10-CM

## 2019-11-01 DIAGNOSIS — Z95.0 PACEMAKER: Primary | ICD-10-CM

## 2019-11-01 PROCEDURE — 93294 REM INTERROG EVL PM/LDLS PM: CPT | Performed by: INTERNAL MEDICINE

## 2019-11-01 PROCEDURE — 93296 REM INTERROG EVL PM/IDS: CPT | Performed by: INTERNAL MEDICINE

## 2020-02-01 ENCOUNTER — HOSPITAL ENCOUNTER (OUTPATIENT)
Age: 80
Discharge: HOME | End: 2020-02-01
Payer: MEDICARE

## 2020-02-01 LAB
ALBUMIN SERPL-MCNC: 4.3 G/DL (ref 3.5–5.2)
ALP BLD-CCNC: 89 U/L (ref 35–104)
ALT SERPL-CCNC: 16 U/L (ref 0–32)
ANION GAP SERPL CALCULATED.3IONS-SCNC: 15 MMOL/L (ref 7–16)
AST SERPL-CCNC: 18 U/L (ref 0–31)
BASOPHILS ABSOLUTE: 0.02 E9/L (ref 0–0.2)
BASOPHILS RELATIVE PERCENT: 0.3 % (ref 0–2)
BILIRUB SERPL-MCNC: 0.5 MG/DL (ref 0–1.2)
BUN BLDV-MCNC: 20 MG/DL (ref 8–23)
CALCIUM SERPL-MCNC: 9.4 MG/DL (ref 8.6–10.2)
CHLORIDE BLD-SCNC: 100 MMOL/L (ref 98–107)
CHOLESTEROL, TOTAL: 153 MG/DL (ref 0–199)
CO2: 28 MMOL/L (ref 22–29)
CREAT SERPL-MCNC: 0.9 MG/DL (ref 0.5–1)
EOSINOPHILS ABSOLUTE: 0.19 E9/L (ref 0.05–0.5)
EOSINOPHILS RELATIVE PERCENT: 2.9 % (ref 0–6)
GFR AFRICAN AMERICAN: >60
GFR NON-AFRICAN AMERICAN: >60 ML/MIN/1.73
GLUCOSE BLD-MCNC: 94 MG/DL (ref 74–99)
HBA1C MFR BLD: 7.9 % (ref 4–5.6)
HCT VFR BLD CALC: 44.5 % (ref 34–48)
HDLC SERPL-MCNC: 50 MG/DL
HEMOGLOBIN: 14.4 G/DL (ref 11.5–15.5)
IMMATURE GRANULOCYTES #: 0.01 E9/L
IMMATURE GRANULOCYTES %: 0.2 % (ref 0–5)
LDL CHOLESTEROL CALCULATED: 80 MG/DL (ref 0–99)
LYMPHOCYTES ABSOLUTE: 1.95 E9/L (ref 1.5–4)
LYMPHOCYTES RELATIVE PERCENT: 29.9 % (ref 20–42)
MCH RBC QN AUTO: 28.7 PG (ref 26–35)
MCHC RBC AUTO-ENTMCNC: 32.4 % (ref 32–34.5)
MCV RBC AUTO: 88.6 FL (ref 80–99.9)
MONOCYTES ABSOLUTE: 0.49 E9/L (ref 0.1–0.95)
MONOCYTES RELATIVE PERCENT: 7.5 % (ref 2–12)
NEUTROPHILS ABSOLUTE: 3.87 E9/L (ref 1.8–7.3)
NEUTROPHILS RELATIVE PERCENT: 59.2 % (ref 43–80)
PDW BLD-RTO: 12.4 FL (ref 11.5–15)
PLATELET # BLD: 165 E9/L (ref 130–450)
PMV BLD AUTO: 8.9 FL (ref 7–12)
POTASSIUM SERPL-SCNC: 3.7 MMOL/L (ref 3.5–5)
RBC # BLD: 5.02 E12/L (ref 3.5–5.5)
SODIUM BLD-SCNC: 143 MMOL/L (ref 132–146)
TOTAL PROTEIN: 7.1 G/DL (ref 6.4–8.3)
TRIGL SERPL-MCNC: 117 MG/DL (ref 0–149)
TSH SERPL DL<=0.05 MIU/L-ACNC: 1.16 UIU/ML (ref 0.27–4.2)
VLDLC SERPL CALC-MCNC: 23 MG/DL
WBC # BLD: 6.5 E9/L (ref 4.5–11.5)

## 2020-02-01 PROCEDURE — 85025 COMPLETE CBC W/AUTO DIFF WBC: CPT

## 2020-02-01 PROCEDURE — 84443 ASSAY THYROID STIM HORMONE: CPT

## 2020-02-01 PROCEDURE — 80053 COMPREHEN METABOLIC PANEL: CPT

## 2020-02-01 PROCEDURE — 80061 LIPID PANEL: CPT

## 2020-02-01 PROCEDURE — 83036 HEMOGLOBIN GLYCOSYLATED A1C: CPT

## 2020-02-01 PROCEDURE — 36415 COLL VENOUS BLD VENIPUNCTURE: CPT

## 2020-02-05 ENCOUNTER — NURSE ONLY (OUTPATIENT)
Dept: NON INVASIVE DIAGNOSTICS | Age: 80
End: 2020-02-05
Payer: COMMERCIAL

## 2020-02-05 PROCEDURE — 93296 REM INTERROG EVL PM/IDS: CPT | Performed by: INTERNAL MEDICINE

## 2020-02-05 PROCEDURE — 93294 REM INTERROG EVL PM/LDLS PM: CPT | Performed by: INTERNAL MEDICINE

## 2020-02-06 ENCOUNTER — TELEPHONE (OUTPATIENT)
Dept: NON INVASIVE DIAGNOSTICS | Age: 80
End: 2020-02-06

## 2020-02-06 NOTE — TELEPHONE ENCOUNTER
Spoke to Mr Ian Geronimo regarding his wife's remote monitor  They are going to plug it in and I scheduled a wireless remote for 02/12/2020  Instructed him we will call if we don't receive it next week

## 2020-02-19 NOTE — PROGRESS NOTES
See [employer] [place] report. Remote monitoring reviewed over a 90 day period. End of 90 day monitoring period date of service 2-5-2020.

## 2020-03-02 RX ORDER — ISOSORBIDE MONONITRATE 30 MG/1
TABLET, EXTENDED RELEASE ORAL
Qty: 90 TABLET | Refills: 3 | Status: SHIPPED
Start: 2020-03-02 | End: 2020-08-27 | Stop reason: SDUPTHER

## 2020-03-13 RX ORDER — METOPROLOL SUCCINATE 50 MG/1
TABLET, EXTENDED RELEASE ORAL
Qty: 180 TABLET | Refills: 3 | Status: SHIPPED
Start: 2020-03-13 | End: 2020-08-27 | Stop reason: SDUPTHER

## 2020-05-13 ENCOUNTER — NURSE ONLY (OUTPATIENT)
Dept: NON INVASIVE DIAGNOSTICS | Age: 80
End: 2020-05-13
Payer: MEDICARE

## 2020-05-13 PROCEDURE — 93296 REM INTERROG EVL PM/IDS: CPT | Performed by: INTERNAL MEDICINE

## 2020-05-13 PROCEDURE — 93294 REM INTERROG EVL PM/LDLS PM: CPT | Performed by: INTERNAL MEDICINE

## 2020-05-15 NOTE — PROGRESS NOTES
See [employer] [place] report. Remote monitoring reviewed over a 90 day period. End of 90 day monitoring period date of service 5.13.2020.

## 2020-05-23 ENCOUNTER — APPOINTMENT (OUTPATIENT)
Dept: CT IMAGING | Age: 80
End: 2020-05-23
Payer: MEDICARE

## 2020-05-23 ENCOUNTER — HOSPITAL ENCOUNTER (EMERGENCY)
Age: 80
Discharge: HOME | End: 2020-05-23
Attending: EMERGENCY MEDICINE
Payer: MEDICARE

## 2020-05-23 VITALS
DIASTOLIC BLOOD PRESSURE: 57 MMHG | OXYGEN SATURATION: 98 % | RESPIRATION RATE: 16 BRPM | TEMPERATURE: 97.9 F | SYSTOLIC BLOOD PRESSURE: 114 MMHG | BODY MASS INDEX: 36.25 KG/M2 | HEART RATE: 60 BPM | WEIGHT: 197 LBS | HEIGHT: 62 IN

## 2020-05-23 LAB
ANION GAP SERPL CALCULATED.3IONS-SCNC: 8 MMOL/L (ref 7–16)
APTT: 32.7 SEC (ref 24.5–35.1)
BASOPHILS ABSOLUTE: 0.02 E9/L (ref 0–0.2)
BASOPHILS RELATIVE PERCENT: 0.3 % (ref 0–2)
BUN BLDV-MCNC: 18 MG/DL (ref 8–23)
CALCIUM SERPL-MCNC: 8.7 MG/DL (ref 8.6–10.2)
CHLORIDE BLD-SCNC: 98 MMOL/L (ref 98–107)
CO2: 33 MMOL/L (ref 22–29)
CREAT SERPL-MCNC: 0.9 MG/DL (ref 0.5–1)
EOSINOPHILS ABSOLUTE: 0.17 E9/L (ref 0.05–0.5)
EOSINOPHILS RELATIVE PERCENT: 3 % (ref 0–6)
GFR AFRICAN AMERICAN: >60
GFR NON-AFRICAN AMERICAN: >60 ML/MIN/1.73
GLUCOSE BLD-MCNC: 351 MG/DL (ref 74–99)
HCT VFR BLD CALC: 40.2 % (ref 34–48)
HEMOGLOBIN: 13.4 G/DL (ref 11.5–15.5)
IMMATURE GRANULOCYTES #: 0.01 E9/L
IMMATURE GRANULOCYTES %: 0.2 % (ref 0–5)
INR BLD: 1.2
LYMPHOCYTES ABSOLUTE: 1.5 E9/L (ref 1.5–4)
LYMPHOCYTES RELATIVE PERCENT: 26.1 % (ref 20–42)
MCH RBC QN AUTO: 29.6 PG (ref 26–35)
MCHC RBC AUTO-ENTMCNC: 33.3 % (ref 32–34.5)
MCV RBC AUTO: 88.9 FL (ref 80–99.9)
MONOCYTES ABSOLUTE: 0.46 E9/L (ref 0.1–0.95)
MONOCYTES RELATIVE PERCENT: 8 % (ref 2–12)
NEUTROPHILS ABSOLUTE: 3.59 E9/L (ref 1.8–7.3)
NEUTROPHILS RELATIVE PERCENT: 62.4 % (ref 43–80)
PDW BLD-RTO: 12.6 FL (ref 11.5–15)
PLATELET # BLD: 144 E9/L (ref 130–450)
PMV BLD AUTO: 9 FL (ref 7–12)
POTASSIUM REFLEX MAGNESIUM: 4 MMOL/L (ref 3.5–5)
PROTHROMBIN TIME: 14 SEC (ref 9.3–12.4)
RBC # BLD: 4.52 E12/L (ref 3.5–5.5)
SODIUM BLD-SCNC: 139 MMOL/L (ref 132–146)
WBC # BLD: 5.8 E9/L (ref 4.5–11.5)

## 2020-05-23 PROCEDURE — 85610 PROTHROMBIN TIME: CPT

## 2020-05-23 PROCEDURE — 85730 THROMBOPLASTIN TIME PARTIAL: CPT

## 2020-05-23 PROCEDURE — 80048 BASIC METABOLIC PNL TOTAL CA: CPT

## 2020-05-23 PROCEDURE — 70486 CT MAXILLOFACIAL W/O DYE: CPT

## 2020-05-23 PROCEDURE — 93005 ELECTROCARDIOGRAM TRACING: CPT | Performed by: EMERGENCY MEDICINE

## 2020-05-23 PROCEDURE — 85025 COMPLETE CBC W/AUTO DIFF WBC: CPT

## 2020-05-23 PROCEDURE — 70450 CT HEAD/BRAIN W/O DYE: CPT

## 2020-05-23 PROCEDURE — 99284 EMERGENCY DEPT VISIT MOD MDM: CPT

## 2020-05-23 RX ORDER — SODIUM CHLORIDE 0.9 % (FLUSH) 0.9 %
10 SYRINGE (ML) INJECTION PRN
Status: DISCONTINUED | OUTPATIENT
Start: 2020-05-23 | End: 2020-05-23 | Stop reason: HOSPADM

## 2020-05-23 ASSESSMENT — ENCOUNTER SYMPTOMS
NAUSEA: 0
BACK PAIN: 0
BLOOD IN STOOL: 0
ABDOMINAL PAIN: 0
COUGH: 0
CHEST TIGHTNESS: 0
FACIAL SWELLING: 1
DIARRHEA: 0
VOMITING: 0
COLOR CHANGE: 1
SHORTNESS OF BREATH: 0

## 2020-05-23 ASSESSMENT — PAIN DESCRIPTION - LOCATION: LOCATION: HEAD

## 2020-05-23 ASSESSMENT — PAIN SCALES - GENERAL: PAINLEVEL_OUTOF10: 5

## 2020-05-23 ASSESSMENT — PAIN DESCRIPTION - PAIN TYPE: TYPE: ACUTE PAIN

## 2020-05-23 NOTE — ED PROVIDER NOTES
Asthma, Atrial fibrillation (HCC), Atrial flutter ([facility] CAD (coronary artery disease), CHB (complete heart block) ([facility] Diabetes mellitus ([facility] Hyperlipidemia, Hypertension, LBBB (left bundle branch block), Sleep apnea, Symptomatic bradycardia, and Thyroid disease. Past Surgical History:  has a past surgical history that includes Hysterectomy; back surgery (2004); fracture surgery (Right, 2006); Cholecystectomy; eye surgery; Parathyroid gland surgery (2004 2002); Colonoscopy (2015); Coronary artery bypass graft (10/8/15); Sternum Debridement (11/11/2015); pacemaker placement (Left, 01/23/2017); Sternum Debridement (11/17/2015); Cardiac catheterization (10/2/2015); and Cardiac catheterization (2017). Social History:  reports that she has never smoked. She has never used smokeless tobacco. She reports current alcohol use. She reports that she does not use drugs. Family History: family history includes Cancer in her father and mother; Diabetes in her father and sister; Heart Disease in her father, mother, and sister. The patients home medications have been reviewed.     Allergies: Asa [aspirin] and Sulfa antibiotics    -------------------------------------------------- RESULTS -------------------------------------------------    Lab  Results for orders placed or performed during the hospital encounter of 05/23/20   CBC Auto Differential   Result Value Ref Range    WBC 5.8 4.5 - 11.5 E9/L    RBC 4.52 3.50 - 5.50 E12/L    Hemoglobin 13.4 11.5 - 15.5 g/dL    Hematocrit 40.2 34.0 - 48.0 %    MCV 88.9 80.0 - 99.9 fL    MCH 29.6 26.0 - 35.0 pg    MCHC 33.3 32.0 - 34.5 %    RDW 12.6 11.5 - 15.0 fL    Platelets 578 490 - 803 E9/L    MPV 9.0 7.0 - 12.0 fL    Neutrophils % 62.4 43.0 - 80.0 %    Immature Granulocytes % 0.2 0.0 - 5.0 %    Lymphocytes % 26.1 20.0 - 42.0 %    Monocytes % 8.0 2.0 - 12.0 %    Eosinophils % 3.0 0.0 - 6.0 %    Basophils % 0.3 0.0 - 2.0 %    Neutrophils Absolute 3.59 1.80 - 7.30 E9/L (1.575 m) 197 lb (89.4 kg)       Oxygen Saturation Interpretation: Normal    --------------------------------------- ED Clinical Course/MDM --------------------------------------    Patient is a 51-year-old female presenting for a cough although which occurred several days previously. Presented today due to concern of ecchymosis around her right eye. Neuro vascularly intact. No signs of orbital entrapment. No hyphema or hemotympanum. Is anticoagulated on Eliquis. Was mechanical fall. Labs and imaging reviewed as well as EKG. No ischemic changes. Electrolytes within normal range. CT of the head and facial bones performed, no acute fractures identified. Presentation most consistent with facial hematoma with delayed presentation of ecchymosis. Patient subsequently felt stable for discharge at this time. Given concerning signs and symptoms for which to return to the emergency department. Additional verbal orders provided. I have spoken with the patient and discussed todays results, in addition to providing specific details for the plan of care and counseling regarding the diagnosis and prognosis. Their questions are answered at this time and they are agreeable with the plan. I discussed at length with them reasons for immediate return here for re evaluation. They will followup with their primary care physician by calling their office on Monday.    --------------------------------- ADDITIONAL PROVIDER NOTES ---------------------------------  At this time the patient is without objective evidence of an acute process requiring hospitalization or inpatient management. They have remained hemodynamically stable throughout their entire ED visit and are stable for discharge with outpatient follow-up. The plan has been discussed in detail and they are aware of the specific conditions for emergent return, as well as the importance of follow-up.       Discharge Medication List as of 5/23/2020 12:02 PM Diagnosis:  1. Contusion of face, initial encounter    2. Closed head injury, initial encounter    3. Fall, initial encounter    4. Chronic anticoagulation        Disposition:  Patient's disposition: Discharge to home  Patient's condition is stable.        Darylene John, DO  Resident  05/23/20 1941

## 2020-05-25 LAB
EKG ATRIAL RATE: 67 BPM
EKG P AXIS: 67 DEGREES
EKG P-R INTERVAL: 262 MS
EKG Q-T INTERVAL: 450 MS
EKG QRS DURATION: 166 MS
EKG QTC CALCULATION (BAZETT): 475 MS
EKG R AXIS: -60 DEGREES
EKG T AXIS: 90 DEGREES
EKG VENTRICULAR RATE: 67 BPM

## 2020-05-25 PROCEDURE — 93010 ELECTROCARDIOGRAM REPORT: CPT | Performed by: INTERNAL MEDICINE

## 2020-05-29 RX ORDER — NITROGLYCERIN 0.4 MG/1
TABLET SUBLINGUAL
Qty: 25 TABLET | Refills: 3 | Status: SHIPPED
Start: 2020-05-29 | End: 2022-01-05 | Stop reason: SDUPTHER

## 2020-07-14 ENCOUNTER — HOSPITAL ENCOUNTER (OUTPATIENT)
Age: 80
Discharge: HOME | End: 2020-07-14
Payer: MEDICARE

## 2020-07-14 LAB
ALBUMIN SERPL-MCNC: 4.4 G/DL (ref 3.5–5.2)
ALP BLD-CCNC: 98 U/L (ref 35–104)
ALT SERPL-CCNC: 25 U/L (ref 0–32)
ANION GAP SERPL CALCULATED.3IONS-SCNC: 14 MMOL/L (ref 7–16)
AST SERPL-CCNC: 23 U/L (ref 0–31)
BASOPHILS ABSOLUTE: 0.02 E9/L (ref 0–0.2)
BASOPHILS RELATIVE PERCENT: 0.3 % (ref 0–2)
BILIRUB SERPL-MCNC: 0.6 MG/DL (ref 0–1.2)
BUN BLDV-MCNC: 18 MG/DL (ref 8–23)
CALCIUM SERPL-MCNC: 9.4 MG/DL (ref 8.6–10.2)
CHLORIDE BLD-SCNC: 101 MMOL/L (ref 98–107)
CHOLESTEROL, TOTAL: 155 MG/DL (ref 0–199)
CO2: 30 MMOL/L (ref 22–29)
CREAT SERPL-MCNC: 0.9 MG/DL (ref 0.5–1)
EOSINOPHILS ABSOLUTE: 0.25 E9/L (ref 0.05–0.5)
EOSINOPHILS RELATIVE PERCENT: 4.1 % (ref 0–6)
GFR AFRICAN AMERICAN: >60
GFR NON-AFRICAN AMERICAN: >60 ML/MIN/1.73
GLUCOSE BLD-MCNC: 147 MG/DL (ref 74–99)
HBA1C MFR BLD: 8.6 % (ref 4–5.6)
HCT VFR BLD CALC: 42 % (ref 34–48)
HDLC SERPL-MCNC: 55 MG/DL
HEMOGLOBIN: 13.8 G/DL (ref 11.5–15.5)
IMMATURE GRANULOCYTES #: 0.02 E9/L
IMMATURE GRANULOCYTES %: 0.3 % (ref 0–5)
LDL CHOLESTEROL CALCULATED: 77 MG/DL (ref 0–99)
LYMPHOCYTES ABSOLUTE: 1.61 E9/L (ref 1.5–4)
LYMPHOCYTES RELATIVE PERCENT: 26.7 % (ref 20–42)
MCH RBC QN AUTO: 29.5 PG (ref 26–35)
MCHC RBC AUTO-ENTMCNC: 32.9 % (ref 32–34.5)
MCV RBC AUTO: 89.7 FL (ref 80–99.9)
MONOCYTES ABSOLUTE: 0.42 E9/L (ref 0.1–0.95)
MONOCYTES RELATIVE PERCENT: 7 % (ref 2–12)
NEUTROPHILS ABSOLUTE: 3.71 E9/L (ref 1.8–7.3)
NEUTROPHILS RELATIVE PERCENT: 61.6 % (ref 43–80)
PDW BLD-RTO: 12.4 FL (ref 11.5–15)
PLATELET # BLD: 159 E9/L (ref 130–450)
PMV BLD AUTO: 9.2 FL (ref 7–12)
POTASSIUM SERPL-SCNC: 3.8 MMOL/L (ref 3.5–5)
RBC # BLD: 4.68 E12/L (ref 3.5–5.5)
SODIUM BLD-SCNC: 145 MMOL/L (ref 132–146)
TOTAL PROTEIN: 6.9 G/DL (ref 6.4–8.3)
TRIGL SERPL-MCNC: 114 MG/DL (ref 0–149)
TSH SERPL DL<=0.05 MIU/L-ACNC: 0.17 UIU/ML (ref 0.27–4.2)
VITAMIN D 25-HYDROXY: 34 NG/ML (ref 30–100)
VLDLC SERPL CALC-MCNC: 23 MG/DL
WBC # BLD: 6 E9/L (ref 4.5–11.5)

## 2020-07-14 PROCEDURE — 80053 COMPREHEN METABOLIC PANEL: CPT

## 2020-07-14 PROCEDURE — 85025 COMPLETE CBC W/AUTO DIFF WBC: CPT

## 2020-07-14 PROCEDURE — 36415 COLL VENOUS BLD VENIPUNCTURE: CPT

## 2020-07-14 PROCEDURE — 84443 ASSAY THYROID STIM HORMONE: CPT

## 2020-07-14 PROCEDURE — 82306 VITAMIN D 25 HYDROXY: CPT

## 2020-07-14 PROCEDURE — 80061 LIPID PANEL: CPT

## 2020-07-14 PROCEDURE — 83036 HEMOGLOBIN GLYCOSYLATED A1C: CPT

## 2020-07-31 RX ORDER — APIXABAN 5 MG/1
TABLET, FILM COATED ORAL
Qty: 60 TABLET | Refills: 11 | Status: SHIPPED
Start: 2020-07-31 | End: 2020-08-27 | Stop reason: SDUPTHER

## 2020-08-12 ENCOUNTER — NURSE ONLY (OUTPATIENT)
Dept: NON INVASIVE DIAGNOSTICS | Age: 80
End: 2020-08-12
Payer: MEDICARE

## 2020-08-12 PROCEDURE — 93294 REM INTERROG EVL PM/LDLS PM: CPT | Performed by: INTERNAL MEDICINE

## 2020-08-12 PROCEDURE — 93296 REM INTERROG EVL PM/IDS: CPT | Performed by: INTERNAL MEDICINE

## 2020-08-17 NOTE — PROGRESS NOTES
See [employer] [place] report. Remote monitoring reviewed over a 90 day period. End of 90 day monitoring period date of service 8.12.2020.

## 2020-08-27 ENCOUNTER — OFFICE VISIT (OUTPATIENT)
Dept: CARDIOLOGY CLINIC | Age: 80
End: 2020-08-27
Payer: MEDICARE

## 2020-08-27 ENCOUNTER — NURSE ONLY (OUTPATIENT)
Dept: NON INVASIVE DIAGNOSTICS | Age: 80
End: 2020-08-27
Payer: MEDICARE

## 2020-08-27 VITALS
HEART RATE: 72 BPM | HEIGHT: 62 IN | SYSTOLIC BLOOD PRESSURE: 120 MMHG | WEIGHT: 199 LBS | DIASTOLIC BLOOD PRESSURE: 72 MMHG | BODY MASS INDEX: 36.62 KG/M2

## 2020-08-27 PROCEDURE — 93280 PM DEVICE PROGR EVAL DUAL: CPT | Performed by: INTERNAL MEDICINE

## 2020-08-27 PROCEDURE — 93000 ELECTROCARDIOGRAM COMPLETE: CPT | Performed by: INTERNAL MEDICINE

## 2020-08-27 PROCEDURE — 99214 OFFICE O/P EST MOD 30 MIN: CPT | Performed by: INTERNAL MEDICINE

## 2020-08-27 RX ORDER — METOPROLOL SUCCINATE 50 MG/1
50 TABLET, EXTENDED RELEASE ORAL 2 TIMES DAILY
Qty: 180 TABLET | Refills: 3 | Status: SHIPPED
Start: 2020-08-27 | End: 2022-01-05 | Stop reason: SDUPTHER

## 2020-08-27 RX ORDER — ISOSORBIDE MONONITRATE 30 MG/1
30 TABLET, EXTENDED RELEASE ORAL DAILY
Qty: 90 TABLET | Refills: 3 | Status: SHIPPED
Start: 2020-08-27 | End: 2021-09-29

## 2020-08-27 RX ORDER — ROSUVASTATIN CALCIUM 5 MG/1
5 TABLET, COATED ORAL DAILY
Qty: 90 TABLET | Refills: 3 | Status: SHIPPED
Start: 2020-08-27 | End: 2021-07-20

## 2020-08-27 RX ORDER — HYDROCHLOROTHIAZIDE 25 MG/1
TABLET ORAL
Qty: 90 TABLET | Refills: 3 | Status: SHIPPED
Start: 2020-08-27 | End: 2022-01-05 | Stop reason: SDUPTHER

## 2020-08-27 RX ORDER — FUROSEMIDE 20 MG/1
TABLET ORAL
Qty: 90 TABLET | Refills: 3 | Status: SHIPPED | OUTPATIENT
Start: 2020-08-27

## 2020-08-27 RX ORDER — RAMIPRIL 10 MG/1
CAPSULE ORAL
Qty: 90 CAPSULE | Refills: 3 | Status: SHIPPED
Start: 2020-08-27 | End: 2021-10-08

## 2020-08-27 NOTE — PROGRESS NOTES
CHIEF COMPLAINT: Chest pain/CAD-CABG    HPI: Patient is a 70 y.o.  seen at the request of Jazzmine Urena MD.      Patient seen in follow up. Patient was seen for chest pain which resulted in a cath showing multivessel CAD. She underwent CABG 14/1/31 but had a complicated post-op course due to a wound infection. Patient admitted 1/17 for PAFlutter. Reverted to sinus. Started on Eliquis. No CP or SOB.      Past Medical History:   Diagnosis Date    Arthritis     Asthma     Atrial fibrillation ([address] 2016    Atrial flutter (HCC)     CAD (coronary artery disease)     CHB (complete heart block) ([address]     Diabetes mellitus ([address]     Hyperlipidemia     no med    Hypertension     LBBB (left bundle branch block)     Sleep apnea     Cpap setting 10    Symptomatic bradycardia     Thyroid disease        Patient Active Problem List   Diagnosis    FLORECITA on CPAP    Hypersomnolence    Diabetes mellitus ([address]    Essential hypertension    Sleep apnea    Coronary artery disease involving native coronary artery of native heart with angina pectoris ([address]    Hypothyroid    Asthma    Hyperlipidemia    H/O CABG x 4 (10-8-15: Dr. Peoples Ashley -> LAD, Free CHRISTINA -> D2, SVG -> OM3, SVG -> RCA)    Postoperative AV block    Postoperative atrial fibrillation (HCC)    Status post cardiac pacemaker placement (1-23-16: Dr. Doris White)   Kris Duong Non morbid obesity due to excess calories    Type 1 diabetes mellitus without complication ([address]    Surgical (sternal) wound infection (Oct. 2015)    Surgical (sternal) wound, non healing (Oct. 2015) - 11-17-15:  Excisional debridment, L pectoralis advancement flap w complex closure Leland Welch MD)    Colitis    SIRS (systemic inflammatory response syndrome) (HCC)    Paroxysmal atrial fibrillation (HCC) - on apixiban    Complete heart block ([address]    Pacemaker    Pacemaker-dependent due to native cardiac rhythm insufficient to support life       Allergies   Allergen Reactions    Asa [Aspirin] Hives    Sulfa Antibiotics Nausea And Vomiting       Current Outpatient Medications   Medication Sig Dispense Refill    furosemide (LASIX) 20 MG tablet TAKE 1 TABLET BY MOUTH DAILY AS NEEDED FOR SWELLING OR ABDOMINAL BLOATING 90 tablet 3    apixaban (ELIQUIS) 5 MG TABS tablet TAKE 1 TABLET BY MOUTH TWICE A  tablet 3    hydroCHLOROthiazide (HYDRODIURIL) 25 MG tablet TAKE 1 TABLET BY MOUTH EVERY DAY 90 tablet 3    isosorbide mononitrate (IMDUR) 30 MG extended release tablet Take 1 tablet by mouth daily 90 tablet 3    metoprolol succinate (TOPROL XL) 50 MG extended release tablet Take 1 tablet by mouth 2 times daily 180 tablet 3    ramipril (ALTACE) 10 MG capsule TAKE 1 CAPSULE BY MOUTH EVERY DAY 90 capsule 3    rosuvastatin (CRESTOR) 5 MG tablet Take 1 tablet by mouth daily 90 tablet 3    nitroGLYCERIN (NITROSTAT) 0.4 MG SL tablet PLEASE SEE ATTACHED FOR DETAILED DIRECTIONS 25 tablet 3    PROAIR  (90 BASE) MCG/ACT inhaler Inhale 2 puffs into the lungs as needed       potassium chloride (KLOR-CON M) 20 MEQ extended release tablet TAKE 1 TABLET BY MOUTH ONCE DAILY AS NEEDED( FOR USE WITH FUROSEMIDE) IF TAKING WATER PILL 90 tablet 1    levothyroxine (SYNTHROID) 150 MCG tablet Take 1 tablet by mouth daily (Patient taking differently: Take 137 mcg by mouth daily ) 30 tablet 3    albuterol (PROVENTIL) (2.5 MG/3ML) 0.083% nebulizer solution Take 2.5 mg by nebulization every 4 hours as needed for Wheezing      vitamin D (ERGOCALCIFEROL) 03467 UNITS CAPS capsule Take 50,000 Units by mouth once a week       insulin NPH (HUMULIN N;NOVOLIN N) 100 UNIT/ML injection Inject into the skin 2 times daily 63 units am  35 units pm       No current facility-administered medications for this visit.         Social History     Socioeconomic History    Marital status:      Spouse name: Not on file    Number of children: Not on file    Years of education: Not on file    Highest education level: Not on file   Occupational History    Not on file   Social Needs    Financial resource strain: Not on file    Food insecurity     Worry: Not on file     Inability: Not on file    Transportation needs     Medical: Not on file     Non-medical: Not on file   Tobacco Use    Smoking status: Never Smoker    Smokeless tobacco: Never Used   Substance and Sexual Activity    Alcohol use: Yes     Alcohol/week: 0.0 standard drinks     Comment: very rarely 1 social drink    Drug use: No    Sexual activity: Not on file   Lifestyle    Physical activity     Days per week: Not on file     Minutes per session: Not on file    Stress: Not on file   Relationships    Social connections     Talks on phone: Not on file     Gets together: Not on file     Attends Voodoo service: Not on file     Active member of club or organization: Not on file     Attends meetings of clubs or organizations: Not on file     Relationship status: Not on file    Intimate partner violence     Fear of current or ex partner: Not on file     Emotionally abused: Not on file     Physically abused: Not on file     Forced sexual activity: Not on file   Other Topics Concern    Not on file   Social History Narrative    Not on file       Family History   Problem Relation Age of Onset    Heart Disease Mother         afib    Cancer Mother         colon    Diabetes Father     Heart Disease Father         MI @ 64    Cancer Father     Diabetes Sister     Heart Disease Sister         ACB in 52's, MI@ 61     Review of Systems:  Heart: as above   Lungs: as above   Eyes: denies changes in vision or discharge. Ears: denies changes in hearing or pain. Nose: denies epistaxis or masses   Throat: denies sore throat or trouble swallowing. Neuro: denies numbness, tingling, tremors. Skin: denies rashes or itching. : denies hematuria, dysuria   GI: denies vomiting, diarrhea   Psych: denies mood changed, anxiety, depression.     Physical Exam   BP 120/72   Pulse 72   Ht 5' 2\" (1.575 m)   Wt 199 lb (90.3 kg)   BMI 36.40 kg/m²   Constitutional: Oriented to person, place, and time. Well-developed and well-nourished. No distress. Head: Normocephalic and atraumatic. Eyes: EOM are normal. Pupils are equal, round, and reactive to light. Neck: Normal range of motion. Neck supple. No hepatojugular reflux and no JVD present. Carotid bruit is not present. No tracheal deviation present. No thyromegaly present. Cardiovascular: Normal rate, regular rhythm, normal heart sounds and intact distal pulses. Exam reveals no gallop and no friction rub. No murmur heard. Pulmonary/Chest: Effort normal and breath sounds normal. No respiratory distress. No wheezes. No rales. No tenderness. Abdominal: Soft. Bowel sounds are normal. No distension and no mass. No tenderness. No rebound and no guarding. Musculoskeletal: Normal range of motion. No edema and no tenderness. Lymphadenopathy:   No cervical adenopathy. No groin adenopathy. Neurological: Alert and oriented to person, place, and time. Skin: Skin is warm and dry. No rash noted. Not diaphoretic. No erythema. Psychiatric: Normal mood and affect. Behavior is normal.     EKG:  NSR, 1st degree AVB, LBBB nonspecific ST and T waves changes.     Echo Summary 1/22/17:   Left ventricle is normal in size .   Normal left ventricle wall thickness   Septal motion consistent with post open heart state   Ejection fraction is visually estimated at 75%.   There is doppler evidence of stage I diastolic dysfunction.   Normal right ventricular size and function.   The left atrium is mildly dilated.   Focal calcification mitral valve leaflets   Mild mitral annular calcification.   Mild mitral regurgitation is present.   The aortic valve appears mildly sclerotic.   Aortic root is sclerotic and calcified    ASSESSMENT AND PLAN:  Patient Active Problem List   Diagnosis    FLORECITA on CPAP    Hypersomnolence    Diabetes mellitus [facility])    Essential hypertension    Sleep apnea    Coronary artery disease involving native coronary artery of native heart with angina pectoris ([facility]    Hypothyroid    Asthma    Hyperlipidemia    H/O CABG x 4 (10-8-15: Dr. Rojelio Opitz -> LAD, Free CHRISTINA -> D2, SVG -> OM3, SVG -> RCA)    Postoperative AV block    Postoperative atrial fibrillation (HCC)    Status post cardiac pacemaker placement (1-23-16: Dr. Harshad Dwyer)   Yumiko Arch Non morbid obesity due to excess calories    Type 1 diabetes mellitus without complication ([facility]    Surgical (sternal) wound infection (Oct. 2015)    Surgical (sternal) wound, non healing (Oct. 2015) - 11-17-15:  Excisional debridment, L pectoralis advancement flap w complex closure Shirley Mijares MD)    Colitis    SIRS (systemic inflammatory response syndrome) (HCC)    Paroxysmal atrial fibrillation (HCC) - on apixiban    Complete heart block ([facility]    Pacemaker    Pacemaker-dependent due to native cardiac rhythm insufficient to support life     1. Chest pain/CAD/Status post CABG:  Exertional chest symptoms. Pharm stress low risk 4/2/19. Continue imdur. Rash with ASA and on Eliquis. Continue coreg/ramipril/crestor. 2. PAF:   In sinus. On BB and eliquis. 3. Myalgias: Tolerant crestor over other day. 4. HTN: Observe. 5. Pacer 1/23/17: Per EP. 6. LBBB: Chronic. 7. DM: Per PCP. Richar Lo D.O.   Cardiologist  Cardiology, [facility]

## 2020-09-25 NOTE — PROGRESS NOTES
CHIEF COMPLAINT: Chest pain/CAD-CABG    HPI: Patient is a 78 y.o.  seen at the request of Shawna Robison MD.      Patient seen in follow up. Patient was seen for chest pain which resulted in a cath showing multivessel CAD. She underwent CABG 19/3/54 but had a complicated post-op course due to a wound infection. Patient admitted 1/17 for PAFlutter. Reverted to sinus. Started on Eliquis. Patient was noting exertional back pain. Started on Imdur and Ranexa. Stable symptoms thereafter. Stress showed normal perfusion 8/9/17. Currently no CP or SOB. No current issues.     Past Medical History:   Diagnosis Date    Arthritis     Asthma     Atrial fibrillation ([address] 2016    Atrial flutter (HCC)     CAD (coronary artery disease)     CHB (complete heart block) ([address]     Diabetes mellitus ([address]     Hyperlipidemia     no med    Hypertension     LBBB (left bundle branch block)     Sleep apnea     Cpap setting 10    Symptomatic bradycardia     Thyroid disease        Patient Active Problem List   Diagnosis    FLORECITA on CPAP    Hypersomnolence    Diabetes mellitus ([address]    Essential hypertension    Sleep apnea    Coronary artery disease involving native coronary artery of native heart with angina pectoris ([address]    Hypothyroid    Asthma    Hyperlipidemia    H/O CABG x 4 (10-8-15: Dr. Audie Killian -> LAD, Free CHRISTINA -> D2, SVG -> OM3, SVG -> RCA)    Postoperative AV block    Postoperative atrial fibrillation (HCC)    Status post cardiac pacemaker placement (1-23-16: Dr. Quinn Ulloa)   [facility] Non morbid obesity due to excess calories    Type 1 diabetes mellitus without complication ([address]    Surgical (sternal) wound infection (Oct. 2015)    Surgical (sternal) wound, non healing (Oct. 2015) - 11-17-15:  Excisional debridment, L pectoralis advancement flap w complex closure Kian Lockett MD)    Colitis    SIRS (systemic inflammatory response syndrome) (HCC)    Paroxysmal atrial fibrillation ([address] - on apixiban Phone Number called: [phone] (home)   Message: Left pt a mess to call back regarding lab results.       calories    Type 1 diabetes mellitus without complication ([facility]    Surgical (sternal) wound infection (Oct. 2015)    Surgical (sternal) wound, non healing (Oct. 2015) - 11-17-15:  Excisional debridment, L pectoralis advancement flap w complex closure Nilo Barragan MD)    Colitis    SIRS (systemic inflammatory response syndrome) (HCC)    Paroxysmal atrial fibrillation ([address] - on apixiban    Complete heart block ([facility]    Pacemaker    Pacemaker-dependent due to native cardiac rhythm insufficient to support life     1. Chest pain/CAD/Status post CABG:  Exertional chest symptoms. Pharm stress normal 8/9/17. Low normal heart rate and BP therefore leave BB at current dose. Continue imdur and Ranexa. Low risk stress therefore medical management. Rash with ASA. Plavix for now given allergy to ASA. Continue coreg/ramipril/crestor. 2. PAF:   In sinus. On BB and eliquis. 3. Myalgias: Tolerant crestor over other day. 4. HTN: Observe. 5. Pacer 1/23/17: Per EP. 6. LBBB: Chronic. 7. DM: Per PCP. Dinah Riley D.O.   Cardiologist  Cardiology, [facility]

## 2020-11-11 ENCOUNTER — NURSE ONLY (OUTPATIENT)
Dept: NON INVASIVE DIAGNOSTICS | Age: 80
End: 2020-11-11
Payer: MEDICARE

## 2020-11-11 PROCEDURE — 93296 REM INTERROG EVL PM/IDS: CPT | Performed by: INTERNAL MEDICINE

## 2020-11-11 PROCEDURE — 93294 REM INTERROG EVL PM/LDLS PM: CPT | Performed by: INTERNAL MEDICINE

## 2020-11-24 NOTE — PROGRESS NOTES
See [employer] [place] report. Remote monitoring reviewed over a 90 day period. End of 90 day monitoring period date of service 11.11.2020.

## 2020-12-24 ENCOUNTER — HOSPITAL ENCOUNTER (OUTPATIENT)
Age: 80
Discharge: HOME | End: 2020-12-24
Payer: MEDICARE

## 2020-12-24 LAB
ALBUMIN SERPL-MCNC: 4 G/DL (ref 3.5–5.2)
ALP BLD-CCNC: 102 U/L (ref 35–104)
ALT SERPL-CCNC: 21 U/L (ref 0–32)
ANION GAP SERPL CALCULATED.3IONS-SCNC: 10 MMOL/L (ref 7–16)
AST SERPL-CCNC: 19 U/L (ref 0–31)
BASOPHILS ABSOLUTE: 0 E9/L (ref 0–0.2)
BASOPHILS RELATIVE PERCENT: 0 % (ref 0–2)
BILIRUB SERPL-MCNC: 0.3 MG/DL (ref 0–1.2)
BUN BLDV-MCNC: 20 MG/DL (ref 8–23)
CALCIUM SERPL-MCNC: 9.4 MG/DL (ref 8.6–10.2)
CHLORIDE BLD-SCNC: 102 MMOL/L (ref 98–107)
CHOLESTEROL, TOTAL: 181 MG/DL (ref 0–199)
CO2: 30 MMOL/L (ref 22–29)
CREAT SERPL-MCNC: 0.9 MG/DL (ref 0.5–1)
EOSINOPHILS ABSOLUTE: 0.37 E9/L (ref 0.05–0.5)
EOSINOPHILS RELATIVE PERCENT: 4.9 % (ref 0–6)
GFR AFRICAN AMERICAN: >60
GFR NON-AFRICAN AMERICAN: >60 ML/MIN/1.73
GLUCOSE BLD-MCNC: 167 MG/DL (ref 74–99)
HBA1C MFR BLD: 8.3 % (ref 4–5.6)
HCT VFR BLD CALC: 43.1 % (ref 34–48)
HDLC SERPL-MCNC: 55 MG/DL
HEMOGLOBIN: 14 G/DL (ref 11.5–15.5)
IMMATURE GRANULOCYTES #: 0.02 E9/L
IMMATURE GRANULOCYTES %: 0.3 % (ref 0–5)
LDL CHOLESTEROL CALCULATED: 98 MG/DL (ref 0–99)
LYMPHOCYTES ABSOLUTE: 1.85 E9/L (ref 1.5–4)
LYMPHOCYTES RELATIVE PERCENT: 24.6 % (ref 20–42)
MCH RBC QN AUTO: 28.6 PG (ref 26–35)
MCHC RBC AUTO-ENTMCNC: 32.5 % (ref 32–34.5)
MCV RBC AUTO: 88 FL (ref 80–99.9)
MONOCYTES ABSOLUTE: 0.52 E9/L (ref 0.1–0.95)
MONOCYTES RELATIVE PERCENT: 6.9 % (ref 2–12)
NEUTROPHILS ABSOLUTE: 4.76 E9/L (ref 1.8–7.3)
NEUTROPHILS RELATIVE PERCENT: 63.3 % (ref 43–80)
PDW BLD-RTO: 12.6 FL (ref 11.5–15)
PLATELET # BLD: 174 E9/L (ref 130–450)
PMV BLD AUTO: 9.3 FL (ref 7–12)
POTASSIUM SERPL-SCNC: 4 MMOL/L (ref 3.5–5)
RBC # BLD: 4.9 E12/L (ref 3.5–5.5)
SODIUM BLD-SCNC: 142 MMOL/L (ref 132–146)
TOTAL PROTEIN: 7 G/DL (ref 6.4–8.3)
TRIGL SERPL-MCNC: 140 MG/DL (ref 0–149)
TSH SERPL DL<=0.05 MIU/L-ACNC: 0.87 UIU/ML (ref 0.27–4.2)
VLDLC SERPL CALC-MCNC: 28 MG/DL
WBC # BLD: 7.5 E9/L (ref 4.5–11.5)

## 2020-12-24 PROCEDURE — 84443 ASSAY THYROID STIM HORMONE: CPT

## 2020-12-24 PROCEDURE — 85025 COMPLETE CBC W/AUTO DIFF WBC: CPT

## 2020-12-24 PROCEDURE — 83036 HEMOGLOBIN GLYCOSYLATED A1C: CPT

## 2020-12-24 PROCEDURE — 80053 COMPREHEN METABOLIC PANEL: CPT

## 2020-12-24 PROCEDURE — 36415 COLL VENOUS BLD VENIPUNCTURE: CPT

## 2020-12-24 PROCEDURE — 80061 LIPID PANEL: CPT

## 2021-02-10 ENCOUNTER — NURSE ONLY (OUTPATIENT)
Dept: NON INVASIVE DIAGNOSTICS | Age: 81
End: 2021-02-10
Payer: MEDICARE

## 2021-02-10 DIAGNOSIS — I44.2 COMPLETE HEART BLOCK (HCC): ICD-10-CM

## 2021-02-10 DIAGNOSIS — I48.0 PAROXYSMAL ATRIAL FIBRILLATION (HCC): ICD-10-CM

## 2021-02-10 DIAGNOSIS — Z95.0 PACEMAKER: Primary | ICD-10-CM

## 2021-02-10 PROCEDURE — 93294 REM INTERROG EVL PM/LDLS PM: CPT | Performed by: INTERNAL MEDICINE

## 2021-02-10 PROCEDURE — 93296 REM INTERROG EVL PM/IDS: CPT | Performed by: INTERNAL MEDICINE

## 2021-02-11 ENCOUNTER — TELEPHONE (OUTPATIENT)
Dept: NON INVASIVE DIAGNOSTICS | Age: 81
End: 2021-02-11

## 2021-02-11 NOTE — TELEPHONE ENCOUNTER
Trying to schedule the patient with either Dr Emily Moralez or Dr Thanh Wyman.    Last seen: 12/18/2018 dx: BSCI PPM  Device: BSCI PPM

## 2021-02-24 NOTE — PROGRESS NOTES
.See [employer] [place] report. Remote monitoring reviewed over a 90 day period.   End of 90 day monitoring period date of service 02/10/2021

## 2021-03-03 ENCOUNTER — OFFICE VISIT (OUTPATIENT)
Dept: NON INVASIVE DIAGNOSTICS | Age: 81
End: 2021-03-03
Payer: MEDICARE

## 2021-03-03 VITALS
SYSTOLIC BLOOD PRESSURE: 144 MMHG | BODY MASS INDEX: 34.78 KG/M2 | HEART RATE: 78 BPM | HEIGHT: 62 IN | RESPIRATION RATE: 18 BRPM | DIASTOLIC BLOOD PRESSURE: 84 MMHG | WEIGHT: 189 LBS

## 2021-03-03 DIAGNOSIS — Z95.1 HX OF CABG: ICD-10-CM

## 2021-03-03 DIAGNOSIS — Z95.0 STATUS POST CARDIAC PACEMAKER PROCEDURE: ICD-10-CM

## 2021-03-03 DIAGNOSIS — I44.2 COMPLETE HEART BLOCK (HCC): ICD-10-CM

## 2021-03-03 DIAGNOSIS — G47.33 OSA ON CPAP: Chronic | ICD-10-CM

## 2021-03-03 DIAGNOSIS — I44.2 COMPLETE HEART BLOCK (HCC): Primary | ICD-10-CM

## 2021-03-03 DIAGNOSIS — I48.0 PAF (PAROXYSMAL ATRIAL FIBRILLATION) (HCC): ICD-10-CM

## 2021-03-03 DIAGNOSIS — Z95.0 PACEMAKER: Primary | ICD-10-CM

## 2021-03-03 DIAGNOSIS — I10 ESSENTIAL HYPERTENSION: Chronic | ICD-10-CM

## 2021-03-03 DIAGNOSIS — Z99.89 OSA ON CPAP: Chronic | ICD-10-CM

## 2021-03-03 PROCEDURE — 99214 OFFICE O/P EST MOD 30 MIN: CPT | Performed by: INTERNAL MEDICINE

## 2021-03-03 PROCEDURE — 93280 PM DEVICE PROGR EVAL DUAL: CPT | Performed by: INTERNAL MEDICINE

## 2021-03-03 RX ORDER — GABAPENTIN 100 MG/1
CAPSULE ORAL
COMMUNITY
Start: 2021-02-28 | End: 2021-07-20

## 2021-03-03 RX ORDER — LEVOTHYROXINE SODIUM 137 UG/1
137 TABLET ORAL DAILY
COMMUNITY
End: 2021-07-20

## 2021-03-03 ASSESSMENT — ENCOUNTER SYMPTOMS
CHEST TIGHTNESS: 0
DIARRHEA: 0
ABDOMINAL PAIN: 0
ABDOMINAL DISTENTION: 0
COUGH: 0
SINUS PRESSURE: 0
COLOR CHANGE: 0
NAUSEA: 0
SHORTNESS OF BREATH: 0
EYE REDNESS: 0
WHEEZING: 0

## 2021-03-03 NOTE — PROGRESS NOTES
SVG -> OM3, SVG -> RCA) 10/09/2015    Postoperative AV block     Coronary artery disease involving native coronary artery of native heart with angina pectoris ([address] 10/02/2015    Diabetes mellitus ([address]     Essential hypertension     Sleep apnea      Overview Note:     Cpap      FLORECITA on CPAP 05/22/2015     Overview Note:     DME - BMS       Hypersomnolence 05/22/2015       Family History   Problem Relation Age of Onset    Heart Disease Mother         afib    Cancer Mother         colon    Diabetes Father     Heart Disease Father         MI @ 64    Cancer Father     Diabetes Sister     Heart Disease Sister         ACB in 52's, MI@ 61         Past Surgical History:   Procedure Laterality Date    BACK SURGERY  2004     Laminectomy    CARDIAC CATHETERIZATION  10/2/2015    Dr Nadeem Miller  2017    CHOLECYSTECTOMY      2012    COLONOSCOPY  2015    CORONARY ARTERY BYPASS GRAFT  10/8/15    x4    EYE SURGERY      2014 Bilateral cataract surgery with lense implants    FRACTURE SURGERY Right 2006     ankle     HYSTERECTOMY      PACEMAKER PLACEMENT Left 01/23/2017    Dr Ortiz [facility] PARATHYROID GLAND SURGERY  2004 2002    STERNUM DEBRIDEMENT  11/11/2015    WITH WOUND VAC PLACEMENT    STERNUM DEBRIDEMENT  11/17/2015    DEBRIDEMENT OF NON-HEALING STERNAL WOUND WITH WASHOUT & CLOSURE       Current Outpatient Medications   Medication Sig Dispense Refill    levothyroxine (SYNTHROID) 137 MCG tablet Take 137 mcg by mouth Daily Take 1 tablet daily      gabapentin (NEURONTIN) 100 MG capsule       furosemide (LASIX) 20 MG tablet TAKE 1 TABLET BY MOUTH DAILY AS NEEDED FOR SWELLING OR ABDOMINAL BLOATING 90 tablet 3    apixaban (ELIQUIS) 5 MG TABS tablet TAKE 1 TABLET BY MOUTH TWICE A  tablet 3    hydroCHLOROthiazide (HYDRODIURIL) 25 MG tablet TAKE 1 TABLET BY MOUTH EVERY DAY 90 tablet 3    isosorbide mononitrate (IMDUR) 30 MG extended release tablet Take 1 tablet by mouth daily 90 tablet 3    metoprolol succinate (TOPROL XL) 50 MG extended release tablet Take 1 tablet by mouth 2 times daily 180 tablet 3    ramipril (ALTACE) 10 MG capsule TAKE 1 CAPSULE BY MOUTH EVERY DAY 90 capsule 3    rosuvastatin (CRESTOR) 5 MG tablet Take 1 tablet by mouth daily 90 tablet 3    nitroGLYCERIN (NITROSTAT) 0.4 MG SL tablet PLEASE SEE ATTACHED FOR DETAILED DIRECTIONS 25 tablet 3    PROAIR  (90 BASE) MCG/ACT inhaler Inhale 2 puffs into the lungs as needed       potassium chloride (KLOR-CON M) 20 MEQ extended release tablet TAKE 1 TABLET BY MOUTH ONCE DAILY AS NEEDED( FOR USE WITH FUROSEMIDE) IF TAKING WATER PILL 90 tablet 1    levothyroxine (SYNTHROID) 150 MCG tablet Take 1 tablet by mouth daily (Patient taking differently: Take 137 mcg by mouth daily Take 137 mcg daily) 30 tablet 3    albuterol (PROVENTIL) (2.5 MG/3ML) 0.083% nebulizer solution Take 2.5 mg by nebulization every 4 hours as needed for Wheezing      vitamin D (ERGOCALCIFEROL) 06833 UNITS CAPS capsule Take 50,000 Units by mouth once a week       insulin NPH (HUMULIN N;NOVOLIN N) 100 UNIT/ML injection Inject into the skin 2 times daily 63 units am  35 units pm       No current facility-administered medications for this visit. Allergies   Allergen Reactions    Asa [Aspirin] Hives    Sulfa Antibiotics Nausea And Vomiting           ROS:   Review of Systems   Constitutional: Negative for fatigue and fever. HENT: Negative for congestion, nosebleeds and sinus pressure. Eyes: Negative for redness and visual disturbance. Respiratory: Negative for cough, chest tightness, shortness of breath and wheezing. Cardiovascular: Negative for chest pain, palpitations and leg swelling. Gastrointestinal: Negative for abdominal distention, abdominal pain, diarrhea and nausea. Endocrine: Negative for cold intolerance, heat intolerance, polydipsia and polyphagia.    Genitourinary: Negative for difficulty urinating, frequency and urgency. Musculoskeletal: Negative for arthralgias, joint swelling and myalgias. Skin: Negative for color change and wound. Neurological: Negative for dizziness, syncope, weakness and numbness. Psychiatric/Behavioral: Negative for agitation, behavioral problems, confusion, decreased concentration, hallucinations and suicidal ideas. The patient is not nervous/anxious. PHYSICAL EXAM:  Vitals:    03/03/21 1224   BP: (!) 144/84   Pulse: 78   Resp: 18   Weight: 189 lb (85.7 kg)   Height: 5' 2\" (1.575 m)     Physical Exam  Vitals signs reviewed. Constitutional:       Appearance: Normal appearance. HENT:      Head: Normocephalic. Mouth/Throat:      Mouth: Mucous membranes are moist.      Pharynx: Oropharynx is clear. Eyes:      Conjunctiva/sclera: Conjunctivae normal.   Neck:      Musculoskeletal: Normal range of motion and neck supple. Vascular: No carotid bruit. Cardiovascular:      Rate and Rhythm: Normal rate and regular rhythm. Pulses: Normal pulses. Heart sounds: Normal heart sounds. Pulmonary:      Effort: Pulmonary effort is normal.      Breath sounds: Normal breath sounds. No rales. Chest:      Chest wall: No tenderness. Abdominal:      General: Bowel sounds are normal.      Palpations: Abdomen is soft. Musculoskeletal: Normal range of motion. Skin:     General: Skin is warm. Neurological:      General: No focal deficit present. Mental Status: She is alert and oriented to person, place, and time. Psychiatric:         Mood and Affect: Mood normal.         Behavior: Behavior normal.         Thought Content:  Thought content normal.          Pertinent Labs:  CBC:   WBC (E9/L)   Date Value   12/24/2020 7.5   07/14/2020 6.0   05/23/2020 5.8     Hemoglobin (g/dL)   Date Value   12/24/2020 14.0   07/14/2020 13.8   05/23/2020 13.4     Hematocrit (%)   Date Value   12/24/2020 43.1   07/14/2020 42.0   05/23/2020 40.2     Platelets (E9/L)   Date Value   12/24/2020 174   07/14/2020 159   05/23/2020 144      BMP:   Sodium (mmol/L)   Date Value   12/24/2020 142   07/14/2020 145   05/23/2020 139     Potassium (mmol/L)   Date Value   12/24/2020 4.0   07/14/2020 3.8   02/01/2020 3.7     Potassium reflex Magnesium (mmol/L)   Date Value   05/23/2020 4.0     Magnesium (mg/dL)   Date Value   10/12/2015 2.9 (H)   10/11/2015 2.8 (H)   10/10/2015 2.7 (H)     Chloride (mmol/L)   Date Value   12/24/2020 102   07/14/2020 101   05/23/2020 98     CO2 (mmol/L)   Date Value   12/24/2020 30 (H)   07/14/2020 30 (H)   05/23/2020 33 (H)     BUN (mg/dL)   Date Value   12/24/2020 20   07/14/2020 18   05/23/2020 18     CREATININE (mg/dL)   Date Value   12/24/2020 0.9   07/14/2020 0.9   05/23/2020 0.9     Glucose (mg/dL)   Date Value   12/24/2020 167 (H)   07/14/2020 147 (H)   05/23/2020 351 (H)   03/06/2012 97   11/14/2011 159 (H)   06/08/2011 163 (H)     Calcium (mg/dL)   Date Value   12/24/2020 9.4   07/14/2020 9.4   05/23/2020 8.7      INR:   INR (no units)   Date Value   05/23/2020 1.2   01/21/2017 1.3   01/16/2016 1.2      BNP: No results found for: BNP   TSH:   TSH (uIU/mL)   Date Value   12/24/2020 0.870   07/14/2020 0.174 (L)   02/01/2020 1.160      Cardiac Injury Profile: Total CK (U/L)   Date Value   01/22/2017 147     CK-MB (ng/mL)   Date Value   01/22/2017 3.6     Troponin (ng/mL)   Date Value   01/22/2017 <0.01     Lipid Profile:   Triglycerides (mg/dL)   Date Value   12/24/2020 140     HDL (mg/dL)   Date Value   12/24/2020 55     LDL Calculated (mg/dL)   Date Value   12/24/2020 98     Cholesterol, Total (mg/dL)   Date Value   12/24/2020 181      Hemoglobin A1C:   Hemoglobin A1C (%)   Date Value   12/24/2020 8.3 (H)           Pertinent Cardiac Testing:     Device Reprogramming ( 12/18/18 )  Make/Model:  BSCI dual chamber PPM   Mode:  DDDR 60/120  Current Rhythm: AsVp  P wave: 4.0 mV. Impedance: 781ohms.  Threshold: 0.8 V @0.4 ms  RV R wave: paced Impedance: 1029 ohms. Threshold: 1.0V @0.4 ms  Pacing: A: 44%  RV:100%  Battery Voltage/Longevity:  10 years    Arrhythmias: none  Reprogramming: see below  Overall device function is normal  All device programmable settings were evaluated per above and in the scanned document, along with iterative adjustments (capture thresholds) to assess and select the most appropriate final programming to provide for consistent delivery of the appropriate therapy and to verify function of the device      4/2019 Stress test  Gated SPECT left ventricular ejection fraction was calculated to   be 77%, with hypokinesis of the apical anterior wall. Impression:     1. ECG during the infusion did not change. 2. The myocardial perfusion imaging was abnormal.  The   abnormality was a a small sized fixed defect in the apical   anterior wall suggestive of a prior MI   3. Overall left ventricular systolic function was abnormal with   regional wall motion abnormalities. 4. Low risk pre-operative pharmacologic stress test.    TTE 1/2017  Summary   Left ventricle is normal in size . Normal left ventricle wall thickness   Septal motion consistent with post open heart state   Ejection fraction is visually estimated at 75%. There is doppler evidence of stage I diastolic dysfunction. Normal right ventricular size and function. The left atrium is mildly dilated. Focal calcification mitral valve leaflets   Mild mitral annular calcification. Mild mitral regurgitation is present. The aortic valve appears mildly sclerotic. Aortic root is sclerotic and calcified    I have independently reviewed all of the ECGs and rhythm strips per above    I have personally reviewed the laboratory, cardiac diagnostic and radiographic testing as outlined above: We have requested previous records. 1. Pacemaker    2. Hx of CABG    3. FLORECITA on CPAP    4. Status post cardiac pacemaker placement (1-23-16: Dr. Genesis Mae)    5.  Essential hypertension 6. Complete heart block (HCC)         ASSESSMENT & PLAN    1. Complete heart block  - S/p dual chamber PPM   - 100% RV paced, check repeat TTE. last one in 2017 showed LVEF 75%     2. S/p PPM   - DOI 1/23/2017  - dual chamber ; BSCI   - see device reprogramming above      3. PAF  - paroxysmal  - <1% on recent remote check  - none recently on today's reprogramming  -  On Eliquis, denies any bleeding issues   - receiving BB      4. CAD  - S/p CABG + wound infection with sternal debridement + flap  - Stress test 2019 detailed above  - Follows Dr Michael Amaro as an outpatient      5. HTN  -  Well controlled at this visit  - continue current regiment  - follow with close BP log     6. DM    7. FLORECITA   - continue CPAP     7. Obesity  Body mass index is 34 kg/m². - recommend ongoing weight loss     8. Hypothyrdoidism  - on replacement therapy   - per PCP      Plan:      1. No changes have been made to her medications   2. Remote transmission in three months  3. Follow-up in one year and to call the office regarding any questions or concerns    I have spent a total of 25 minutes with the patient and his/her family reviewing the above stated recommendations. A total of >50% of that time involved face-to-face time providing counseling and or coordination of care with the other providers. Thank you for allowing me to participate in your patient's care.     Melody Vega MD  Cardiac Electrophysiology  [address] [place] [address]

## 2021-03-22 ENCOUNTER — HOSPITAL ENCOUNTER (OUTPATIENT)
Age: 81
Discharge: HOME | End: 2021-03-22
Payer: MEDICARE

## 2021-03-22 LAB
ALBUMIN SERPL-MCNC: 4.4 G/DL (ref 3.5–5.2)
ALP BLD-CCNC: 101 U/L (ref 35–104)
ALT SERPL-CCNC: 22 U/L (ref 0–32)
ANION GAP SERPL CALCULATED.3IONS-SCNC: 9 MMOL/L (ref 7–16)
AST SERPL-CCNC: 20 U/L (ref 0–31)
BASOPHILS ABSOLUTE: 0.01 E9/L (ref 0–0.2)
BASOPHILS RELATIVE PERCENT: 0.1 % (ref 0–2)
BILIRUB SERPL-MCNC: 0.4 MG/DL (ref 0–1.2)
BUN BLDV-MCNC: 23 MG/DL (ref 8–23)
CALCIUM SERPL-MCNC: 9.4 MG/DL (ref 8.6–10.2)
CHLORIDE BLD-SCNC: 100 MMOL/L (ref 98–107)
CHOLESTEROL, TOTAL: 157 MG/DL (ref 0–199)
CO2: 33 MMOL/L (ref 22–29)
CREAT SERPL-MCNC: 0.9 MG/DL (ref 0.5–1)
EOSINOPHILS ABSOLUTE: 0.38 E9/L (ref 0.05–0.5)
EOSINOPHILS RELATIVE PERCENT: 4.7 % (ref 0–6)
GFR AFRICAN AMERICAN: >60
GFR NON-AFRICAN AMERICAN: >60 ML/MIN/1.73
GLUCOSE BLD-MCNC: 118 MG/DL (ref 74–99)
HBA1C MFR BLD: 8.2 % (ref 4–5.6)
HCT VFR BLD CALC: 43.7 % (ref 34–48)
HDLC SERPL-MCNC: 55 MG/DL
HEMOGLOBIN: 14.5 G/DL (ref 11.5–15.5)
IMMATURE GRANULOCYTES #: 0.01 E9/L
IMMATURE GRANULOCYTES %: 0.1 % (ref 0–5)
LDL CHOLESTEROL CALCULATED: 83 MG/DL (ref 0–99)
LYMPHOCYTES ABSOLUTE: 2.31 E9/L (ref 1.5–4)
LYMPHOCYTES RELATIVE PERCENT: 28.7 % (ref 20–42)
MCH RBC QN AUTO: 29.4 PG (ref 26–35)
MCHC RBC AUTO-ENTMCNC: 33.2 % (ref 32–34.5)
MCV RBC AUTO: 88.5 FL (ref 80–99.9)
MONOCYTES ABSOLUTE: 0.54 E9/L (ref 0.1–0.95)
MONOCYTES RELATIVE PERCENT: 6.7 % (ref 2–12)
NEUTROPHILS ABSOLUTE: 4.79 E9/L (ref 1.8–7.3)
NEUTROPHILS RELATIVE PERCENT: 59.7 % (ref 43–80)
PDW BLD-RTO: 12.8 FL (ref 11.5–15)
PLATELET # BLD: 176 E9/L (ref 130–450)
PMV BLD AUTO: 9.2 FL (ref 7–12)
POTASSIUM SERPL-SCNC: 3.4 MMOL/L (ref 3.5–5)
RBC # BLD: 4.94 E12/L (ref 3.5–5.5)
SODIUM BLD-SCNC: 142 MMOL/L (ref 132–146)
TOTAL PROTEIN: 7.2 G/DL (ref 6.4–8.3)
TRIGL SERPL-MCNC: 93 MG/DL (ref 0–149)
TSH SERPL DL<=0.05 MIU/L-ACNC: 1.82 UIU/ML (ref 0.27–4.2)
URIC ACID, SERUM: 6.9 MG/DL (ref 2.4–5.7)
VITAMIN D 25-HYDROXY: 26 NG/ML (ref 30–100)
VLDLC SERPL CALC-MCNC: 19 MG/DL
WBC # BLD: 8 E9/L (ref 4.5–11.5)

## 2021-03-22 PROCEDURE — 85025 COMPLETE CBC W/AUTO DIFF WBC: CPT

## 2021-03-22 PROCEDURE — 82306 VITAMIN D 25 HYDROXY: CPT

## 2021-03-22 PROCEDURE — 84443 ASSAY THYROID STIM HORMONE: CPT

## 2021-03-22 PROCEDURE — 80053 COMPREHEN METABOLIC PANEL: CPT

## 2021-03-22 PROCEDURE — 84550 ASSAY OF BLOOD/URIC ACID: CPT

## 2021-03-22 PROCEDURE — 80061 LIPID PANEL: CPT

## 2021-03-22 PROCEDURE — 36415 COLL VENOUS BLD VENIPUNCTURE: CPT

## 2021-03-22 PROCEDURE — 83036 HEMOGLOBIN GLYCOSYLATED A1C: CPT

## 2021-04-08 ENCOUNTER — HOSPITAL ENCOUNTER (OUTPATIENT)
Dept: CARDIOLOGY | Age: 81
Discharge: HOME | End: 2021-04-08
Payer: MEDICARE

## 2021-04-08 DIAGNOSIS — I44.2 COMPLETE HEART BLOCK (HCC): ICD-10-CM

## 2021-04-08 DIAGNOSIS — I48.0 PAF (PAROXYSMAL ATRIAL FIBRILLATION) (HCC): ICD-10-CM

## 2021-04-08 LAB
LV EF: 60 %
LVEF MODALITY: NORMAL

## 2021-04-08 PROCEDURE — 93306 TTE W/DOPPLER COMPLETE: CPT

## 2021-04-09 ENCOUNTER — TELEPHONE (OUTPATIENT)
Dept: NON INVASIVE DIAGNOSTICS | Age: 81
End: 2021-04-09

## 2021-04-09 NOTE — TELEPHONE ENCOUNTER
----- Message from Chano Hamilton MD sent at 4/9/2021  8:28 AM EDT -----  LVEF is normal. Pls notify patient  ----- Message -----  From: Norbert Santo Incoming Cardiology Results From \[facility] [place] [facility]\""  Sent: 4/8/2021   5:22 PM EDT  To: Chano Hamilton MD

## 2021-05-12 ENCOUNTER — NURSE ONLY (OUTPATIENT)
Dept: NON INVASIVE DIAGNOSTICS | Age: 81
End: 2021-05-12
Payer: MEDICARE

## 2021-05-12 DIAGNOSIS — I48.0 PAF (PAROXYSMAL ATRIAL FIBRILLATION) (HCC): ICD-10-CM

## 2021-05-12 DIAGNOSIS — I44.2 COMPLETE HEART BLOCK (HCC): Primary | ICD-10-CM

## 2021-05-12 DIAGNOSIS — Z95.0 PACEMAKER: ICD-10-CM

## 2021-05-30 PROCEDURE — 93296 REM INTERROG EVL PM/IDS: CPT | Performed by: INTERNAL MEDICINE

## 2021-05-31 PROCEDURE — 93294 REM INTERROG EVL PM/LDLS PM: CPT | Performed by: INTERNAL MEDICINE

## 2021-07-14 ENCOUNTER — TELEPHONE (OUTPATIENT)
Dept: CARDIOLOGY CLINIC | Age: 81
End: 2021-07-14

## 2021-07-14 NOTE — TELEPHONE ENCOUNTER
PCP is asking for patient to be seen ASAP for an abnormal EKG (scanned in chart), no appointments until August,please advise

## 2021-07-20 ENCOUNTER — OFFICE VISIT (OUTPATIENT)
Dept: CARDIOLOGY CLINIC | Age: 81
End: 2021-07-20
Payer: MEDICARE

## 2021-07-20 VITALS
DIASTOLIC BLOOD PRESSURE: 86 MMHG | BODY MASS INDEX: 37.69 KG/M2 | HEIGHT: 62 IN | RESPIRATION RATE: 18 BRPM | WEIGHT: 204.8 LBS | SYSTOLIC BLOOD PRESSURE: 140 MMHG | HEART RATE: 60 BPM

## 2021-07-20 DIAGNOSIS — I25.119 CORONARY ARTERY DISEASE INVOLVING NATIVE CORONARY ARTERY OF NATIVE HEART WITH ANGINA PECTORIS (HCC): Primary | ICD-10-CM

## 2021-07-20 PROCEDURE — 99214 OFFICE O/P EST MOD 30 MIN: CPT | Performed by: INTERNAL MEDICINE

## 2021-07-20 PROCEDURE — 93000 ELECTROCARDIOGRAM COMPLETE: CPT | Performed by: INTERNAL MEDICINE

## 2021-07-20 RX ORDER — ATORVASTATIN CALCIUM 20 MG/1
20 TABLET, FILM COATED ORAL DAILY
COMMUNITY
End: 2021-09-15

## 2021-07-20 NOTE — PROGRESS NOTES
CHIEF COMPLAINT: Chest pain/CAD-CABG/Abnormal EKG    HPI: Patient is a 68 y.o.  seen at the request of Ava Jerez DO. Patient seen in follow up. Patient was seen for chest pain which resulted in a cath showing multivessel CAD. She underwent CABG 00/4/89 but had a complicated post-op course due to a wound infection. More recently with EKG that was reported as abnormal and presents for recommendations. No CP or SOB.      Past Medical History:   Diagnosis Date    Arthritis     Asthma     Atrial fibrillation ([address] 2016    Atrial flutter (HCC)     CAD (coronary artery disease)     CHB (complete heart block) ([address]     Diabetes mellitus ([address]     Hyperlipidemia     no med    Hypertension     LBBB (left bundle branch block)     Sleep apnea     Cpap setting 10    Symptomatic bradycardia     Thyroid disease        Patient Active Problem List   Diagnosis    FLORECITA on CPAP    Hypersomnolence    Diabetes mellitus ([address]    Essential hypertension    Sleep apnea    Coronary artery disease involving native coronary artery of native heart with angina pectoris ([address]    Hypothyroid    Asthma    Hyperlipidemia    H/O CABG x 4 (10-8-15: Dr. Agustin Olivas -> LAD, Free CHRISTINA -> D2, SVG -> OM3, SVG -> RCA)    Postoperative AV block    Postoperative atrial fibrillation (HCC)    Status post cardiac pacemaker placement (1-23-16: Dr. Satish Edwards)   Veronika Me Non morbid obesity due to excess calories    Type 1 diabetes mellitus without complication ([address]    Surgical (sternal) wound infection (Oct. 2015)    Surgical (sternal) wound, non healing (Oct. 2015) - 11-17-15:  Excisional debridment, L pectoralis advancement flap w complex closure Zaid Rahman MD)    Colitis    SIRS (systemic inflammatory response syndrome) (HCC)    Paroxysmal atrial fibrillation (HCC) - on apixiban    Complete heart block ([address]    Pacemaker    Pacemaker-dependent due to native cardiac rhythm insufficient to support life       Allergies Allergen Reactions    Asa [Aspirin] Hives    Sulfa Antibiotics Nausea And Vomiting       Current Outpatient Medications   Medication Sig Dispense Refill    atorvastatin (LIPITOR) 20 MG tablet Take 20 mg by mouth daily      apixaban (ELIQUIS) 5 MG TABS tablet TAKE 1 TABLET BY MOUTH TWICE A  tablet 3    furosemide (LASIX) 20 MG tablet TAKE 1 TABLET BY MOUTH DAILY AS NEEDED FOR SWELLING OR ABDOMINAL BLOATING 90 tablet 3    hydroCHLOROthiazide (HYDRODIURIL) 25 MG tablet TAKE 1 TABLET BY MOUTH EVERY DAY 90 tablet 3    isosorbide mononitrate (IMDUR) 30 MG extended release tablet Take 1 tablet by mouth daily 90 tablet 3    metoprolol succinate (TOPROL XL) 50 MG extended release tablet Take 1 tablet by mouth 2 times daily 180 tablet 3    ramipril (ALTACE) 10 MG capsule TAKE 1 CAPSULE BY MOUTH EVERY DAY 90 capsule 3    nitroGLYCERIN (NITROSTAT) 0.4 MG SL tablet PLEASE SEE ATTACHED FOR DETAILED DIRECTIONS 25 tablet 3    PROAIR  (90 BASE) MCG/ACT inhaler Inhale 2 puffs into the lungs as needed       potassium chloride (KLOR-CON M) 20 MEQ extended release tablet TAKE 1 TABLET BY MOUTH ONCE DAILY AS NEEDED( FOR USE WITH FUROSEMIDE) IF TAKING WATER PILL 90 tablet 1    levothyroxine (SYNTHROID) 150 MCG tablet Take 1 tablet by mouth daily (Patient taking differently: Take 125 mcg by mouth daily Take 125 mcg daily) 30 tablet 3    albuterol (PROVENTIL) (2.5 MG/3ML) 0.083% nebulizer solution Take 2.5 mg by nebulization every 4 hours as needed for Wheezing      vitamin D (ERGOCALCIFEROL) 92860 UNITS CAPS capsule Take 50,000 Units by mouth once a week       insulin NPH (HUMULIN N;NOVOLIN N) 100 UNIT/ML injection Inject into the skin 2 times daily 63 units am  35 units pm       No current facility-administered medications for this visit.        Social History     Socioeconomic History    Marital status:      Spouse name: Not on file    Number of children: Not on file    Years of education: Not on file    Highest education level: Not on file   Occupational History    Not on file   Tobacco Use    Smoking status: Never Smoker    Smokeless tobacco: Never Used   Vaping Use    Vaping Use: Never used   Substance and Sexual Activity    Alcohol use: Yes     Alcohol/week: 0.0 standard drinks     Comment: very rarely 1 social drink    Drug use: No    Sexual activity: Not on file   Other Topics Concern    Not on file   Social History Narrative    Not on file     Social Determinants of Health     Financial Resource Strain:     Difficulty of Paying Living Expenses:    Food Insecurity:     Worried About Running Out of Food in the Last Year:     [address] in the Last Year:    Transportation Needs:     Lack of Transportation (Medical):  Lack of Transportation (Non-Medical):    Physical Activity:     Days of Exercise per Week:     Minutes of Exercise per Session:    Stress:     Feeling of Stress :    Social Connections:     Frequency of Communication with Friends and Family:     Frequency of Social Gatherings with Friends and Family:     Attends Anabaptism Services:     Active Member of Clubs or Organizations:     Attends Club or Organization Meetings:     Marital Status:    Intimate Partner Violence:     Fear of Current or Ex-Partner:     Emotionally Abused:     Physically Abused:     Sexually Abused:        Family History   Problem Relation Age of Onset    Heart Disease Mother         afib    Cancer Mother         colon    Diabetes Father     Heart Disease Father         MI @ 64    Cancer Father     Diabetes Sister     Heart Disease Sister         ACB in 52's, MI@ 61     Review of Systems:  Heart: as above   Lungs: as above   Eyes: denies changes in vision or discharge. Ears: denies changes in hearing or pain. Nose: denies epistaxis or masses   Throat: denies sore throat or trouble swallowing. Neuro: denies numbness, tingling, tremors. Skin: denies rashes or itching.    : denies hematuria, dysuria   GI: denies vomiting, diarrhea   Psych: denies mood changed, anxiety, depression. Physical Exam   BP (!) 140/86   Pulse 60   Resp 18   Ht 5' 2\" (1.575 m)   Wt 204 lb 12.8 oz (92.9 kg)   BMI 37.46 kg/m²   Constitutional: Oriented to person, place, and time. Well-developed and well-nourished. No distress. Head: Normocephalic and atraumatic. Eyes: EOM are normal. Pupils are equal, round, and reactive to light. Neck: Normal range of motion. Neck supple. No hepatojugular reflux and no JVD present. Carotid bruit is not present. No tracheal deviation present. No thyromegaly present. Cardiovascular: Normal rate, regular rhythm, normal heart sounds and intact distal pulses. Exam reveals no gallop and no friction rub. No murmur heard. Pulmonary/Chest: Effort normal and breath sounds normal. No respiratory distress. No wheezes. No rales. No tenderness. Abdominal: Soft. Bowel sounds are normal. No distension and no mass. No tenderness. No rebound and no guarding. Musculoskeletal: Normal range of motion. No edema and no tenderness. Lymphadenopathy:   No cervical adenopathy. No groin adenopathy. Neurological: Alert and oriented to person, place, and time. Skin: Skin is warm and dry. No rash noted. Not diaphoretic. No erythema. Psychiatric: Normal mood and affect. Behavior is normal.     EKG:  NSR, A-V paced.     Echo Summary 1/22/17:   Left ventricle is normal in size .   Normal left ventricle wall thickness   Septal motion consistent with post open heart state   Ejection fraction is visually estimated at 75%.   There is doppler evidence of stage I diastolic dysfunction.   Normal right ventricular size and function.   The left atrium is mildly dilated.   Focal calcification mitral valve leaflets   Mild mitral annular calcification.   Mild mitral regurgitation is present.   The aortic valve appears mildly sclerotic.   Aortic root is sclerotic and calcified    Echo Summary 4/8/2021:   LVEF 60%. Normal left ventricle size and systolic function. Stage I diastolic dysfunction. Mild mitral regurgitation. ASSESSMENT AND PLAN:  Patient Active Problem List   Diagnosis    FLORECITA on CPAP    Hypersomnolence    Diabetes mellitus [facility]    Essential hypertension    Sleep apnea    Coronary artery disease involving native coronary artery of native heart with angina pectoris ([facility]    Hypothyroid    Asthma    Hyperlipidemia    H/O CABG x 4 (10-8-15: Dr. Correa Moraima -> LAD, Free CHRISTINA -> D2, SVG -> OM3, SVG -> RCA)    Postoperative AV block    Postoperative atrial fibrillation (HCC)    Status post cardiac pacemaker placement (1-23-16: Dr. Constance Hampton)   Debra See Non morbid obesity due to excess calories    Type 1 diabetes mellitus without complication ([facility]    Surgical (sternal) wound infection (Oct. 2015)    Surgical (sternal) wound, non healing (Oct. 2015) - 11-17-15:  Excisional debridment, L pectoralis advancement flap w complex closure Kyle Beatty MD)    Colitis    SIRS (systemic inflammatory response syndrome) (HCC)    Paroxysmal atrial fibrillation (HCC) - on apixiban    Complete heart block ([facility]    Pacemaker    Pacemaker-dependent due to native cardiac rhythm insufficient to support life     1. Dizziness/Weakness:    Not noting racing with episodes. Try holding statin. 2. Chest pain/CAD/Status post CABG:    Pharm stress low risk 4/2/19. Continue coreg/ramipril/statin/imdur. Rash with ASA and on Eliquis. 3. PAF: In sinus. BB and eliquis. 4. Lipids: Statin. 5. HTN: Observe. 6. Pacer: Per EP. 7. LBBB: Chronic. 8. DM: Per PCP. Sancho Jolley D.O.   Cardiologist  Cardiology, [address] [place] [address]

## 2021-07-26 ENCOUNTER — HOSPITAL ENCOUNTER (OUTPATIENT)
Age: 81
Discharge: HOME | End: 2021-07-26
Payer: MEDICARE

## 2021-07-26 LAB
ALBUMIN SERPL-MCNC: 4.1 G/DL (ref 3.5–5.2)
ALP BLD-CCNC: 98 U/L (ref 35–104)
ALT SERPL-CCNC: 25 U/L (ref 0–32)
ANION GAP SERPL CALCULATED.3IONS-SCNC: 11 MMOL/L (ref 7–16)
AST SERPL-CCNC: 21 U/L (ref 0–31)
BASOPHILS ABSOLUTE: 0.01 E9/L (ref 0–0.2)
BASOPHILS RELATIVE PERCENT: 0.1 % (ref 0–2)
BILIRUB SERPL-MCNC: 0.4 MG/DL (ref 0–1.2)
BUN BLDV-MCNC: 20 MG/DL (ref 6–23)
CALCIUM SERPL-MCNC: 9.9 MG/DL (ref 8.6–10.2)
CHLORIDE BLD-SCNC: 100 MMOL/L (ref 98–107)
CHOLESTEROL, TOTAL: 211 MG/DL (ref 0–199)
CO2: 31 MMOL/L (ref 22–29)
CREAT SERPL-MCNC: 1 MG/DL (ref 0.5–1)
EOSINOPHILS ABSOLUTE: 0.39 E9/L (ref 0.05–0.5)
EOSINOPHILS RELATIVE PERCENT: 4.7 % (ref 0–6)
GFR AFRICAN AMERICAN: >60
GFR NON-AFRICAN AMERICAN: 54 ML/MIN/1.73
GLUCOSE BLD-MCNC: 123 MG/DL (ref 74–99)
HBA1C MFR BLD: 8.1 % (ref 4–5.6)
HCT VFR BLD CALC: 42.9 % (ref 34–48)
HDLC SERPL-MCNC: 54 MG/DL
HEMOGLOBIN: 14.1 G/DL (ref 11.5–15.5)
IMMATURE GRANULOCYTES #: 0.03 E9/L
IMMATURE GRANULOCYTES %: 0.4 % (ref 0–5)
LDL CHOLESTEROL CALCULATED: 126 MG/DL (ref 0–99)
LYMPHOCYTES ABSOLUTE: 2.27 E9/L (ref 1.5–4)
LYMPHOCYTES RELATIVE PERCENT: 27.5 % (ref 20–42)
MCH RBC QN AUTO: 29.1 PG (ref 26–35)
MCHC RBC AUTO-ENTMCNC: 32.9 % (ref 32–34.5)
MCV RBC AUTO: 88.5 FL (ref 80–99.9)
MONOCYTES ABSOLUTE: 0.58 E9/L (ref 0.1–0.95)
MONOCYTES RELATIVE PERCENT: 7 % (ref 2–12)
NEUTROPHILS ABSOLUTE: 4.97 E9/L (ref 1.8–7.3)
NEUTROPHILS RELATIVE PERCENT: 60.3 % (ref 43–80)
PDW BLD-RTO: 13.1 FL (ref 11.5–15)
PLATELET # BLD: 169 E9/L (ref 130–450)
PMV BLD AUTO: 9.2 FL (ref 7–12)
POTASSIUM SERPL-SCNC: 4 MMOL/L (ref 3.5–5)
RBC # BLD: 4.85 E12/L (ref 3.5–5.5)
SODIUM BLD-SCNC: 142 MMOL/L (ref 132–146)
TOTAL PROTEIN: 6.9 G/DL (ref 6.4–8.3)
TRIGL SERPL-MCNC: 153 MG/DL (ref 0–149)
TSH SERPL DL<=0.05 MIU/L-ACNC: 2.74 UIU/ML (ref 0.27–4.2)
URIC ACID, SERUM: 7.2 MG/DL (ref 2.4–5.7)
VITAMIN D 25-HYDROXY: 29 NG/ML (ref 30–100)
VLDLC SERPL CALC-MCNC: 31 MG/DL
WBC # BLD: 8.3 E9/L (ref 4.5–11.5)

## 2021-07-26 PROCEDURE — 85025 COMPLETE CBC W/AUTO DIFF WBC: CPT

## 2021-07-26 PROCEDURE — 82306 VITAMIN D 25 HYDROXY: CPT

## 2021-07-26 PROCEDURE — 84550 ASSAY OF BLOOD/URIC ACID: CPT

## 2021-07-26 PROCEDURE — 80061 LIPID PANEL: CPT

## 2021-07-26 PROCEDURE — 80053 COMPREHEN METABOLIC PANEL: CPT

## 2021-07-26 PROCEDURE — 83036 HEMOGLOBIN GLYCOSYLATED A1C: CPT

## 2021-07-26 PROCEDURE — 84443 ASSAY THYROID STIM HORMONE: CPT

## 2021-07-26 PROCEDURE — 36415 COLL VENOUS BLD VENIPUNCTURE: CPT

## 2021-08-04 ENCOUNTER — TELEPHONE (OUTPATIENT)
Dept: CARDIOLOGY CLINIC | Age: 81
End: 2021-08-04

## 2021-08-04 NOTE — TELEPHONE ENCOUNTER
Continue off for now. Isrrael Shen D.O.   Cardiologist  Cardiology, [address] [facility]

## 2021-08-04 NOTE — TELEPHONE ENCOUNTER
Patient states since being off her statin she is no longer feels weakness and lightheadedness. She wanted you to know.

## 2021-08-27 ENCOUNTER — OFFICE VISIT (OUTPATIENT)
Dept: CARDIOLOGY CLINIC | Age: 81
End: 2021-08-27
Payer: MEDICARE

## 2021-08-27 VITALS
WEIGHT: 203.2 LBS | HEIGHT: 62 IN | SYSTOLIC BLOOD PRESSURE: 124 MMHG | BODY MASS INDEX: 37.39 KG/M2 | HEART RATE: 60 BPM | OXYGEN SATURATION: 97 % | DIASTOLIC BLOOD PRESSURE: 68 MMHG | RESPIRATION RATE: 16 BRPM

## 2021-08-27 DIAGNOSIS — I25.119 CORONARY ARTERY DISEASE INVOLVING NATIVE CORONARY ARTERY OF NATIVE HEART WITH ANGINA PECTORIS (HCC): Primary | ICD-10-CM

## 2021-08-27 PROCEDURE — 93000 ELECTROCARDIOGRAM COMPLETE: CPT | Performed by: INTERNAL MEDICINE

## 2021-08-27 PROCEDURE — 99214 OFFICE O/P EST MOD 30 MIN: CPT | Performed by: INTERNAL MEDICINE

## 2021-08-27 RX ORDER — ALLOPURINOL 100 MG/1
100 TABLET ORAL DAILY
COMMUNITY

## 2021-08-27 NOTE — PROGRESS NOTES
CHIEF COMPLAINT: Chest pain/CAD-CABG/Abnormal EKG    HPI: Patient is a 68 y.o.  seen at the request of Ava Jerez DO. Patient seen in follow up. Patient was seen for chest pain which resulted in a cath showing multivessel CAD. She underwent CABG 96/7/16 but had a complicated post-op course due to a wound infection. More recently with EKG that was reported as abnormal and presents for recommendations. No CP or SOB.      Past Medical History:   Diagnosis Date    Arthritis     Asthma     Atrial fibrillation ([address] 2016    Atrial flutter (HCC)     CAD (coronary artery disease)     CHB (complete heart block) ([address]     Diabetes mellitus ([address]     Hyperlipidemia     no med    Hypertension     LBBB (left bundle branch block)     Sleep apnea     Cpap setting 10    Symptomatic bradycardia     Thyroid disease        Patient Active Problem List   Diagnosis    FLORECITA on CPAP    Hypersomnolence    Diabetes mellitus ([address]    Essential hypertension    Sleep apnea    Coronary artery disease involving native coronary artery of native heart with angina pectoris ([address]    Hypothyroid    Asthma    Hyperlipidemia    H/O CABG x 4 (10-8-15: Dr. Karol Hurd -> LAD, Free CHRISTINA -> D2, SVG -> OM3, SVG -> RCA)    Postoperative AV block    Postoperative atrial fibrillation (HCC)    Status post cardiac pacemaker placement (1-23-16: Dr. Chel Taylor)   [address] [place] [address] Non morbid obesity due to excess calories    Type 1 diabetes mellitus without complication ([address]    Surgical (sternal) wound infection (Oct. 2015)    Surgical (sternal) wound, non healing (Oct. 2015) - 11-17-15:  Excisional debridment, L pectoralis advancement flap w complex closure David John MD)    Colitis    SIRS (systemic inflammatory response syndrome) (HCC)    Paroxysmal atrial fibrillation (HCC) - on apixiban    Complete heart block ([address]    Pacemaker    Pacemaker-dependent due to native cardiac rhythm insufficient to support life       Allergies Allergen Reactions    Asa [Aspirin] Hives    Sulfa Antibiotics Nausea And Vomiting       Current Outpatient Medications   Medication Sig Dispense Refill    atorvastatin (LIPITOR) 20 MG tablet Take 20 mg by mouth daily      apixaban (ELIQUIS) 5 MG TABS tablet TAKE 1 TABLET BY MOUTH TWICE A  tablet 3    furosemide (LASIX) 20 MG tablet TAKE 1 TABLET BY MOUTH DAILY AS NEEDED FOR SWELLING OR ABDOMINAL BLOATING 90 tablet 3    hydroCHLOROthiazide (HYDRODIURIL) 25 MG tablet TAKE 1 TABLET BY MOUTH EVERY DAY 90 tablet 3    isosorbide mononitrate (IMDUR) 30 MG extended release tablet Take 1 tablet by mouth daily 90 tablet 3    metoprolol succinate (TOPROL XL) 50 MG extended release tablet Take 1 tablet by mouth 2 times daily 180 tablet 3    ramipril (ALTACE) 10 MG capsule TAKE 1 CAPSULE BY MOUTH EVERY DAY 90 capsule 3    nitroGLYCERIN (NITROSTAT) 0.4 MG SL tablet PLEASE SEE ATTACHED FOR DETAILED DIRECTIONS 25 tablet 3    PROAIR  (90 BASE) MCG/ACT inhaler Inhale 2 puffs into the lungs as needed       potassium chloride (KLOR-CON M) 20 MEQ extended release tablet TAKE 1 TABLET BY MOUTH ONCE DAILY AS NEEDED( FOR USE WITH FUROSEMIDE) IF TAKING WATER PILL 90 tablet 1    levothyroxine (SYNTHROID) 150 MCG tablet Take 1 tablet by mouth daily (Patient taking differently: Take 125 mcg by mouth daily Take 125 mcg daily) 30 tablet 3    albuterol (PROVENTIL) (2.5 MG/3ML) 0.083% nebulizer solution Take 2.5 mg by nebulization every 4 hours as needed for Wheezing      vitamin D (ERGOCALCIFEROL) 38043 UNITS CAPS capsule Take 50,000 Units by mouth once a week       insulin NPH (HUMULIN N;NOVOLIN N) 100 UNIT/ML injection Inject into the skin 2 times daily 63 units am  35 units pm       No current facility-administered medications for this visit.        Social History     Socioeconomic History    Marital status:      Spouse name: Not on file    Number of children: Not on file    Years of education: Not on file    Highest education level: Not on file   Occupational History    Not on file   Tobacco Use    Smoking status: Never Smoker    Smokeless tobacco: Never Used   Vaping Use    Vaping Use: Never used   Substance and Sexual Activity    Alcohol use: Yes     Alcohol/week: 0.0 standard drinks     Comment: very rarely 1 social drink    Drug use: No    Sexual activity: Not on file   Other Topics Concern    Not on file   Social History Narrative    Not on file     Social Determinants of Health     Financial Resource Strain:     Difficulty of Paying Living Expenses:    Food Insecurity:     Worried About Running Out of Food in the Last Year:     [address] in the Last Year:    Transportation Needs:     Lack of Transportation (Medical):  Lack of Transportation (Non-Medical):    Physical Activity:     Days of Exercise per Week:     Minutes of Exercise per Session:    Stress:     Feeling of Stress :    Social Connections:     Frequency of Communication with Friends and Family:     Frequency of Social Gatherings with Friends and Family:     Attends Holiness Services:     Active Member of Clubs or Organizations:     Attends Club or Organization Meetings:     Marital Status:    Intimate Partner Violence:     Fear of Current or Ex-Partner:     Emotionally Abused:     Physically Abused:     Sexually Abused:        Family History   Problem Relation Age of Onset    Heart Disease Mother         afib    Cancer Mother         colon    Diabetes Father     Heart Disease Father         MI @ 64    Cancer Father     Diabetes Sister     Heart Disease Sister         ACB in 52's, MI@ 61     Review of Systems:  Heart: as above   Lungs: as above   Eyes: denies changes in vision or discharge. Ears: denies changes in hearing or pain. Nose: denies epistaxis or masses   Throat: denies sore throat or trouble swallowing. Neuro: denies numbness, tingling, tremors. Skin: denies rashes or itching.    : denies hematuria, dysuria   GI: denies vomiting, diarrhea   Psych: denies mood changed, anxiety, depression. Physical Exam   Ht 5' 2\" (1.575 m)   BMI 37.46 kg/m²   Constitutional: Oriented to person, place, and time. Well-developed and well-nourished. No distress. Head: Normocephalic and atraumatic. Eyes: EOM are normal. Pupils are equal, round, and reactive to light. Neck: Normal range of motion. Neck supple. No hepatojugular reflux and no JVD present. Carotid bruit is not present. No tracheal deviation present. No thyromegaly present. Cardiovascular: Normal rate, regular rhythm, normal heart sounds and intact distal pulses. Exam reveals no gallop and no friction rub. No murmur heard. Pulmonary/Chest: Effort normal and breath sounds normal. No respiratory distress. No wheezes. No rales. No tenderness. Abdominal: Soft. Bowel sounds are normal. No distension and no mass. No tenderness. No rebound and no guarding. Musculoskeletal: Normal range of motion. No edema and no tenderness. Lymphadenopathy:   No cervical adenopathy. No groin adenopathy. Neurological: Alert and oriented to person, place, and time. Skin: Skin is warm and dry. No rash noted. Not diaphoretic. No erythema. Psychiatric: Normal mood and affect. Behavior is normal.     EKG:  NSR, A-V paced. Echo Summary 1/22/17:   Left ventricle is normal in size .   Normal left ventricle wall thickness   Septal motion consistent with post open heart state   Ejection fraction is visually estimated at 75%.   There is doppler evidence of stage I diastolic dysfunction.   Normal right ventricular size and function.   The left atrium is mildly dilated.   Focal calcification mitral valve leaflets   Mild mitral annular calcification.   Mild mitral regurgitation is present.   The aortic valve appears mildly sclerotic.   Aortic root is sclerotic and calcified    Echo Summary 4/8/2021:   LVEF 60%.    Normal left ventricle size and systolic function. Stage I diastolic dysfunction. Mild mitral regurgitation. ASSESSMENT AND PLAN:  Patient Active Problem List   Diagnosis    FLORECITA on CPAP    Hypersomnolence    Diabetes mellitus ([facility]    Essential hypertension    Sleep apnea    Coronary artery disease involving native coronary artery of native heart with angina pectoris ([facility]    Hypothyroid    Asthma    Hyperlipidemia    H/O CABG x 4 (10-8-15: Dr. Lucy Molina -> LAD, Free CHRISTINA -> D2, SVG -> OM3, SVG -> RCA)    Postoperative AV block    Postoperative atrial fibrillation (HCC)    Status post cardiac pacemaker placement (1-23-16: Dr. Radha Castro)   [employer] Non morbid obesity due to excess calories    Type 1 diabetes mellitus without complication ([facility]    Surgical (sternal) wound infection (Oct. 2015)    Surgical (sternal) wound, non healing (Oct. 2015) - 11-17-15:  Excisional debridment, L pectoralis advancement flap w complex closure Owen Person MD)    Colitis    SIRS (systemic inflammatory response syndrome) (HCC)    Paroxysmal atrial fibrillation (HCC) - on apixiban    Complete heart block ([facility]    Pacemaker    Pacemaker-dependent due to native cardiac rhythm insufficient to support life     1. Dizziness/Weakness:    Not noting racing with episodes. Improved with holding statin. 2. Chest pain/CAD/Status post CABG:    Pharm stress low risk 4/2/19. Continue coreg/ramipril/statin/imdur. Rash with ASA and on Eliquis. Intolerant to statin. 3. PAF: In sinus. BB and eliquis. 4. Lipids: Intolerant to atorvastatin. 5. HTN: Observe. 6. Pacer: Per EP. 7. LBBB: Chronic. 8. DM: Per PCP. David Marcial D.O.   Cardiologist  Cardiology, [facility]

## 2021-09-15 ENCOUNTER — OFFICE VISIT (OUTPATIENT)
Dept: NON INVASIVE DIAGNOSTICS | Age: 81
End: 2021-09-15
Payer: MEDICARE

## 2021-09-15 VITALS
SYSTOLIC BLOOD PRESSURE: 136 MMHG | HEIGHT: 62 IN | HEART RATE: 65 BPM | DIASTOLIC BLOOD PRESSURE: 80 MMHG | RESPIRATION RATE: 18 BRPM | BODY MASS INDEX: 37.54 KG/M2 | WEIGHT: 204 LBS

## 2021-09-15 DIAGNOSIS — T81.89XS NON-HEALING SURGICAL WOUND, SEQUELA: ICD-10-CM

## 2021-09-15 DIAGNOSIS — Z95.1 S/P CABG X 4: Primary | ICD-10-CM

## 2021-09-15 DIAGNOSIS — Z95.0 PACEMAKER: ICD-10-CM

## 2021-09-15 DIAGNOSIS — I10 ESSENTIAL HYPERTENSION: ICD-10-CM

## 2021-09-15 DIAGNOSIS — I48.0 PAROXYSMAL ATRIAL FIBRILLATION (HCC): ICD-10-CM

## 2021-09-15 DIAGNOSIS — G47.33 OSA ON CPAP: ICD-10-CM

## 2021-09-15 DIAGNOSIS — I44.2 COMPLETE HEART BLOCK (HCC): ICD-10-CM

## 2021-09-15 DIAGNOSIS — Z99.89 OSA ON CPAP: ICD-10-CM

## 2021-09-15 PROCEDURE — 99214 OFFICE O/P EST MOD 30 MIN: CPT | Performed by: INTERNAL MEDICINE

## 2021-09-15 ASSESSMENT — ENCOUNTER SYMPTOMS
WHEEZING: 0
NAUSEA: 0
ABDOMINAL DISTENTION: 0
EYE REDNESS: 0
CHEST TIGHTNESS: 0
DIARRHEA: 0
SINUS PRESSURE: 0
COUGH: 0
COLOR CHANGE: 0
ABDOMINAL PAIN: 0
SHORTNESS OF BREATH: 0

## 2021-09-15 NOTE — PROGRESS NOTES
Cardiac Electrophysiology Outpatient Progress Note    Freida Kenny  1940  Date of Service: 9/15/2021  Referring Provider/PCP: Cheryl Maxwell DO  Chief Complaint:   Chief Complaint   Patient presents with    Irregular Heart Beat     6 month f/u [facility]-  Patient has no complaints         HISTORY OF PRESENT ILLNESS    Freida Kenny presents to the office today for the management of these Electrophysiology conditions:  CHB, symptomatic bradycardia, cLBBB S/p PPM 1/23/2017      This is a 68 y.o.  female with a history of DM, HTN, CAD, s/p CABG x4 in 10/15 with Dr. Gloria Browning which was complicated by sternal wound infection which required a flap. She has known LBBB and post operatively was seen by Dr. Alisia Meng for AV block. Her AV block recovered quickly however. She had recurrent symptomatic complete heart block. She is now S/p left sided dual chamber BSCI PPM 1/23/2017      9/15/21 :She reports feeling overall well. She denies any chest pain, sob, dizziness, syncope. She works from home but tries to remain active. Her device site looks well healed and free from infection or erosion. She offers no complaints from a device POV. Currently denies any angina, syncope, dyspnea on exertion, paroxysmal nocturnal dyspnea and palpitations. The patient continues to be followed remotely.     She continues to see Dr Iftikhar Malin with cardiology, she is intolerant to statins    Patient Active Problem List    Diagnosis Date Noted    Pacemaker-dependent due to native cardiac rhythm insufficient to support life 12/07/2017    Pacemaker 08/24/2017    Complete heart block ([address] 01/22/2017    Paroxysmal atrial fibrillation ([address] - on apixiban 01/16/2016    Colitis 11/21/2015    SIRS (systemic inflammatory response syndrome) ([address] 11/21/2015    Surgical (sternal) wound, non healing (Oct. 2015) - 11-17-15:  Excisional debridment, L pectoralis advancement flap w complex closure Yolanda Roa MD) 11/18/2015    Surgical (sternal) wound infection (Oct. 2015) 11/12/2015    Non morbid obesity due to excess calories 10/12/2015    Type 1 diabetes mellitus without complication (HCC)     Status post cardiac pacemaker placement (1-23-16: Dr. Julio Valenzuela)     Postoperative atrial fibrillation ([facility]     Hypothyroid 10/09/2015    Asthma 10/09/2015    Hyperlipidemia 10/09/2015    H/O CABG x 4 (10-8-15: Dr. Esmer Salinas -> LAD, Free CHRISTINA -> D2, SVG -> OM3, SVG -> RCA) 10/09/2015    Postoperative AV block     Coronary artery disease involving native coronary artery of native heart with angina pectoris ([facility] 10/02/2015    Diabetes mellitus [facility]     Essential hypertension     Sleep apnea      Overview Note:     Cpap      FLORECITA on CPAP 05/22/2015     Overview Note:     DME - BMS       Hypersomnolence 05/22/2015       Family History   Problem Relation Age of Onset    Heart Disease Mother         afib    Cancer Mother         colon    Diabetes Father     Heart Disease Father         MI @ 64    Cancer Father     Diabetes Sister     Heart Disease Sister         ACB in 52's, MI@ 61         Past Surgical History:   Procedure Laterality Date    BACK SURGERY  2004     Laminectomy    CARDIAC CATHETERIZATION  10/2/2015    Dr Nataly Berman  2017    CHOLECYSTECTOMY      2012    COLONOSCOPY  2015    CORONARY ARTERY BYPASS GRAFT  10/8/15    x4    EYE SURGERY      2014 Bilateral cataract surgery with lense implants    FRACTURE SURGERY Right 2006     ankle     HYSTERECTOMY      PACEMAKER PLACEMENT Left 01/23/2017    Dr Shubham Wheeler PARATHYROID GLAND SURGERY  2004 2002    STERNUM DEBRIDEMENT  11/11/2015    WITH WOUND VAC PLACEMENT    STERNUM DEBRIDEMENT  11/17/2015    DEBRIDEMENT OF NON-HEALING STERNAL WOUND WITH WASHOUT & CLOSURE       Current Outpatient Medications   Medication Sig Dispense Refill    allopurinol (ZYLOPRIM) 100 MG tablet Take 100 mg by mouth daily      apixaban (ELIQUIS) 5 MG TABS Respiratory: Negative for cough, chest tightness, shortness of breath and wheezing. Cardiovascular: Negative for chest pain, palpitations and leg swelling. Gastrointestinal: Negative for abdominal distention, abdominal pain, diarrhea and nausea. Endocrine: Negative for cold intolerance, heat intolerance, polydipsia and polyphagia. Genitourinary: Negative for difficulty urinating, frequency and urgency. Musculoskeletal: Negative for arthralgias, joint swelling and myalgias. Skin: Negative for color change and wound. Neurological: Negative for dizziness, syncope, weakness and numbness. Psychiatric/Behavioral: Negative for agitation, behavioral problems, confusion, decreased concentration, hallucinations and suicidal ideas. The patient is not nervous/anxious. PHYSICAL EXAM:  Vitals:    09/15/21 1056   BP: 136/80   Pulse: 65   Resp: 18   Weight: 204 lb (92.5 kg)   Height: 5' 2\" (1.575 m)     Physical Exam  Vitals reviewed. Constitutional:       Appearance: Normal appearance. HENT:      Head: Normocephalic. Mouth/Throat:      Mouth: Mucous membranes are moist.      Pharynx: Oropharynx is clear. Eyes:      Conjunctiva/sclera: Conjunctivae normal.   Neck:      Vascular: No carotid bruit. Cardiovascular:      Rate and Rhythm: Normal rate and regular rhythm. Pulses: Normal pulses. Heart sounds: Normal heart sounds. Pulmonary:      Effort: Pulmonary effort is normal.      Breath sounds: Normal breath sounds. No rales. Chest:      Chest wall: No tenderness. Abdominal:      General: Bowel sounds are normal.      Palpations: Abdomen is soft. Musculoskeletal:         General: Normal range of motion. Cervical back: Normal range of motion and neck supple. Skin:     General: Skin is warm. Neurological:      General: No focal deficit present. Mental Status: She is alert and oriented to person, place, and time.    Psychiatric:         Mood and Affect: Mood normal.         Behavior: Behavior normal.         Thought Content: Thought content normal.          Pertinent Labs:  CBC:   WBC (E9/L)   Date Value   07/26/2021 8.3   03/22/2021 8.0   12/24/2020 7.5     Hemoglobin (g/dL)   Date Value   07/26/2021 14.1   03/22/2021 14.5   12/24/2020 14.0     Hematocrit (%)   Date Value   07/26/2021 42.9   03/22/2021 43.7   12/24/2020 43.1     Platelets (Q0/J)   Date Value   07/26/2021 169   03/22/2021 176   12/24/2020 174      BMP:   Sodium (mmol/L)   Date Value   07/26/2021 142   03/22/2021 142   12/24/2020 142     Potassium (mmol/L)   Date Value   07/26/2021 4.0   03/22/2021 3.4 (L)   12/24/2020 4.0     Potassium reflex Magnesium (mmol/L)   Date Value   05/23/2020 4.0     Magnesium (mg/dL)   Date Value   10/12/2015 2.9 (H)   10/11/2015 2.8 (H)   10/10/2015 2.7 (H)     Chloride (mmol/L)   Date Value   07/26/2021 100   03/22/2021 100   12/24/2020 102     CO2 (mmol/L)   Date Value   07/26/2021 31 (H)   03/22/2021 33 (H)   12/24/2020 30 (H)     BUN (mg/dL)   Date Value   07/26/2021 20   03/22/2021 23   12/24/2020 20     CREATININE (mg/dL)   Date Value   07/26/2021 1.0   03/22/2021 0.9   12/24/2020 0.9     Glucose (mg/dL)   Date Value   07/26/2021 123 (H)   03/22/2021 118 (H)   12/24/2020 167 (H)   03/06/2012 97   11/14/2011 159 (H)   06/08/2011 163 (H)     Calcium (mg/dL)   Date Value   07/26/2021 9.9   03/22/2021 9.4   12/24/2020 9.4      INR:   INR (no units)   Date Value   05/23/2020 1.2   01/21/2017 1.3   01/16/2016 1.2      BNP: No results found for: BNP   TSH:   TSH (uIU/mL)   Date Value   07/26/2021 2.740   03/22/2021 1.820   12/24/2020 0.870      Cardiac Injury Profile:    Total CK (U/L)   Date Value   01/22/2017 147     CK-MB (ng/mL)   Date Value   01/22/2017 3.6     Troponin (ng/mL)   Date Value   01/22/2017 <0.01     Lipid Profile:   Triglycerides (mg/dL)   Date Value   07/26/2021 153 (H)     HDL (mg/dL)   Date Value   07/26/2021 54     LDL Calculated (mg/dL)   Date Value 07/26/2021 126 (H)     Cholesterol, Total (mg/dL)   Date Value   07/26/2021 211 (H)      Hemoglobin A1C:   Hemoglobin A1C (%)   Date Value   07/26/2021 8.1 (H)           Pertinent Cardiac Testing:     Device Interrogation: 9/15/21   Underlying rhythm: CHB  Mode: DDDR   Pacing: A: 44%  RV: 100%    P wave: 3.5 mV  Impedance: 682 ohms   Threshold: 0.7 V @ 0.4 ms  RV R wave: paced Impedance: 1011 ohms   Threshold: 0.9 V @ 0.4 ms  Episodes: none  Reprogramming included: see below  Overall device function is normal    All device programmable settings were evaluated per above and in the scanned document, along with iterative adjustments (capture thresholds) to assess and select the most appropriate final programming to provide for consistent delivery of the appropriate therapy and to verify function of the device      4/2019 Stress test  Gated SPECT left ventricular ejection fraction was calculated to   be 77%, with hypokinesis of the apical anterior wall. Impression:     1. ECG during the infusion did not change. 2. The myocardial perfusion imaging was abnormal.  The   abnormality was a a small sized fixed defect in the apical   anterior wall suggestive of a prior MI   3. Overall left ventricular systolic function was abnormal with   regional wall motion abnormalities. 4. Low risk pre-operative pharmacologic stress test.    TTE 1/2017  Summary   Left ventricle is normal in size . Normal left ventricle wall thickness   Septal motion consistent with post open heart state   Ejection fraction is visually estimated at 75%. There is doppler evidence of stage I diastolic dysfunction. Normal right ventricular size and function. The left atrium is mildly dilated. Focal calcification mitral valve leaflets   Mild mitral annular calcification. Mild mitral regurgitation is present. The aortic valve appears mildly sclerotic.    Aortic root is sclerotic and calcified    4/8/21 TTE   Summary   Normal left ventricle size and systolic function. Stage I diastolic dysfunction. Mild mitral regurgitation. I have independently reviewed all of the ECGs and rhythm strips per above    I have personally reviewed the laboratory, cardiac diagnostic and radiographic testing as outlined above: We have requested previous records. 1. H/O CABG x 4 (10-8-15: Dr. Correa Moraima -> LAD, Free CHRISTINA -> D2, SVG -> OM3, SVG -> RCA)    2. FLORECITA on CPAP    3. Complete heart block ([address]    4. Pacemaker    5. Essential hypertension    6. Paroxysmal atrial fibrillation (HCC) - on apixiban    7. Non-healing surgical wound, sequela         ASSESSMENT & PLAN    1. Complete heart block  - S/p dual chamber PPM   - 100% RV paced, Repeat TTE 4/8/21 shows normal LV function     2. S/p PPM   - DOI 1/23/2017  - dual chamber ; BSCI   - see device reprogramming above      3. PAF  - paroxysmal  - 0% on device interrogaton  -  On Eliquis, denies any bleeding issues   - receiving BB      4. CAD  - S/p CABG + wound infection with sternal debridement + flap  - Stress test 2019 detailed above  - Follows Dr. Marialuisa Kaur as an outpatient      5. HTN  -  Well controlled at this visit  - continue current regiment  - follow with close BP log     6. DM    7. FLORECITA   - continue CPAP     7. Obesity  -Body mass index is 37.31 kg/m². - recommend ongoing weight loss     8. Hypothyrdoidism  - on replacement therapy   - per PCP      Plan:      1. No changes have been made to her medications   2. Remote transmission in three months  3. Follow-up in 6 months and to call the office regarding any questions or concerns    I have spent a total of 25 minutes with the patient and his/her family reviewing the above stated recommendations. A total of >50% of that time involved face-to-face time providing counseling and or coordination of care with the other providers. Thank you for allowing me to participate in your patient's care.     Angie Rojas MD  Cardiac Electrophysiology  [address] [place] [address]

## 2021-09-29 RX ORDER — ISOSORBIDE MONONITRATE 30 MG/1
TABLET, EXTENDED RELEASE ORAL
Qty: 90 TABLET | Refills: 3 | Status: SHIPPED
Start: 2021-09-29 | End: 2022-01-05 | Stop reason: SDUPTHER

## 2021-10-08 RX ORDER — RAMIPRIL 10 MG/1
CAPSULE ORAL
Qty: 90 CAPSULE | Refills: 3 | Status: SHIPPED
Start: 2021-10-08 | End: 2022-09-30

## 2021-11-30 ENCOUNTER — OFFICE VISIT (OUTPATIENT)
Dept: CARDIOLOGY CLINIC | Age: 81
End: 2021-11-30
Payer: MEDICARE

## 2021-11-30 VITALS
HEART RATE: 70 BPM | RESPIRATION RATE: 18 BRPM | SYSTOLIC BLOOD PRESSURE: 126 MMHG | BODY MASS INDEX: 37.56 KG/M2 | HEIGHT: 62 IN | DIASTOLIC BLOOD PRESSURE: 70 MMHG | WEIGHT: 204.1 LBS

## 2021-11-30 DIAGNOSIS — I25.119 CORONARY ARTERY DISEASE INVOLVING NATIVE CORONARY ARTERY OF NATIVE HEART WITH ANGINA PECTORIS (HCC): Primary | ICD-10-CM

## 2021-11-30 DIAGNOSIS — I25.9 CHEST PAIN DUE TO MYOCARDIAL ISCHEMIA, UNSPECIFIED ISCHEMIC CHEST PAIN TYPE: ICD-10-CM

## 2021-11-30 PROCEDURE — 93000 ELECTROCARDIOGRAM COMPLETE: CPT | Performed by: INTERNAL MEDICINE

## 2021-11-30 PROCEDURE — 99214 OFFICE O/P EST MOD 30 MIN: CPT | Performed by: INTERNAL MEDICINE

## 2021-11-30 RX ORDER — MULTIVIT-MIN/IRON/FOLIC ACID/K 18-600-40
2000 CAPSULE ORAL DAILY
COMMUNITY

## 2021-12-02 ENCOUNTER — TELEPHONE (OUTPATIENT)
Dept: CARDIOLOGY | Age: 81
End: 2021-12-02

## 2021-12-02 NOTE — TELEPHONE ENCOUNTER
CALLED PATIENT AND LEFT MESSAGE TO SCHEDULE PAT STRESS TEST      Electronically signed by Cruz Goddard on 12/2/2021 at 12:04 PM  .

## 2021-12-08 ENCOUNTER — TELEPHONE (OUTPATIENT)
Dept: CARDIOLOGY | Age: 81
End: 2021-12-08

## 2021-12-08 NOTE — TELEPHONE ENCOUNTER
12/08/21 Reminder Call for [address] test 12/10/21 @ 7013  included Pre procedure stress instructions and COVID check list.  Instructed to check blood glucose prior to leaving home, and do not take diabetic medications if not eating. Do not take Metoprolol 24 hours prior to stress test and bring to restart. Remaining medications can be taken as scheduled. Bring inhalers. She acknowledged understanding.

## 2021-12-10 ENCOUNTER — HOSPITAL ENCOUNTER (OUTPATIENT)
Dept: CARDIOLOGY | Age: 81
Discharge: HOME | End: 2021-12-10
Payer: MEDICARE

## 2021-12-10 VITALS
HEART RATE: 77 BPM | WEIGHT: 204 LBS | HEIGHT: 61 IN | SYSTOLIC BLOOD PRESSURE: 142 MMHG | DIASTOLIC BLOOD PRESSURE: 64 MMHG | BODY MASS INDEX: 38.51 KG/M2

## 2021-12-10 DIAGNOSIS — I25.9 CHEST PAIN DUE TO MYOCARDIAL ISCHEMIA, UNSPECIFIED ISCHEMIC CHEST PAIN TYPE: ICD-10-CM

## 2021-12-10 DIAGNOSIS — I25.119 CORONARY ARTERY DISEASE INVOLVING NATIVE CORONARY ARTERY OF NATIVE HEART WITH ANGINA PECTORIS (HCC): ICD-10-CM

## 2021-12-10 PROCEDURE — A9500 TC99M SESTAMIBI: HCPCS | Performed by: INTERNAL MEDICINE

## 2021-12-10 PROCEDURE — 78452 HT MUSCLE IMAGE SPECT MULT: CPT

## 2021-12-10 PROCEDURE — 2580000003 HC RX 258: Performed by: INTERNAL MEDICINE

## 2021-12-10 PROCEDURE — 3430000000 HC RX DIAGNOSTIC RADIOPHARMACEUTICAL: Performed by: INTERNAL MEDICINE

## 2021-12-10 PROCEDURE — 93017 CV STRESS TEST TRACING ONLY: CPT

## 2021-12-10 PROCEDURE — 6360000002 HC RX W HCPCS: Performed by: INTERNAL MEDICINE

## 2021-12-10 RX ORDER — SODIUM CHLORIDE 0.9 % (FLUSH) 0.9 %
10 SYRINGE (ML) INJECTION PRN
Status: DISCONTINUED | OUTPATIENT
Start: 2021-12-10 | End: 2021-12-11 | Stop reason: HOSPADM

## 2021-12-10 RX ADMIN — Medication 29.5 MILLICURIE: at 11:38

## 2021-12-10 RX ADMIN — SODIUM CHLORIDE, PRESERVATIVE FREE 10 ML: 5 INJECTION INTRAVENOUS at 11:38

## 2021-12-10 RX ADMIN — Medication 10.2 MILLICURIE: at 08:36

## 2021-12-10 RX ADMIN — SODIUM CHLORIDE, PRESERVATIVE FREE 10 ML: 5 INJECTION INTRAVENOUS at 11:39

## 2021-12-10 RX ADMIN — SODIUM CHLORIDE, PRESERVATIVE FREE 10 ML: 5 INJECTION INTRAVENOUS at 08:36

## 2021-12-10 RX ADMIN — REGADENOSON 0.4 MG: 0.08 INJECTION, SOLUTION INTRAVENOUS at 11:38

## 2021-12-10 NOTE — PROCEDURES
[address] [facility] [address]  [phone]                Pharmacologic Stress Nuclear Gated SPECT Study    Name: Gamal Carty Account Number: [de-identified]    :  1940          Sex: female         Date of Study:  12/10/2021    Height: 5' 1\" (154.9 cm)         Weight: 204 lb (92.5 kg)     Ordering Provider: Luís Qureshi DO          PCP: Phil Schultz DO      Cardiologist: DO Chepe Garcia MD            Interpreting Physician: Richard Rivera MD  _________________________________________________________________________________    Indication:   Evaluation of extent and severity of coronary artery disease    Clinical History:   Patient has prior history of coronary artery disease. Resting ECG:    Normal sinus rhythm with first-degree AV block, frequent premature atrial complexes, occasional ventricular complex, anterolateral infarct age undetermined, nonspecific interventricular conduction delay, abnormal EKG. Procedure:   Pharmacologic stress testing was performed with regadenoson 0.4 mg for 15 seconds. The heart rate was 77 at baseline and donnie to 93 beats during the infusion. This corresponds to 65% of maximum predicted heart rate. The blood pressure at baseline was 142/64 and blood pressure at the end of infusion was 122/60. Blood pressure response was normal during the stress procedure. The patient experienced increased breathing, \"head felt foggy\". Post sips of cold tea, symptoms lessened. ECG during the infusion did not change. IMAGING: Myocardial perfusion imaging was performed at rest 30-35 minutes following the intravenous injection of 10.2 mCi of (Tc-Sestamibi) followed by 10 ml of Normal Saline. As per infusion protocol, the patient was injected intravenously with 29.5 mCi of (Tc-Sestamibi) followed by 10 ml of Normal Saline.   Gated post-stress tomographic imaging was performed 20-25 minutes after stress. FINDINGS: The overall quality of the study was good. Left ventricular cavity size was noted to be normal.    Rotational analog analysis demonstrated no patient motion or abnormal extracardiac radioactivity. There is soft tissue breast attenuation artifact. The gated SPECT stress imaging in the short, vertical long, and horizontal long axis demonstrated normal homogeneous tracer distribution throughout the myocardium. A mild defect was present in the apical anterior wall(s) that was  moderate sized by quantification. The resting images reveal partial reversibility. Gated SPECT left ventricular ejection fraction was calculated to be 73%, with normal myocardial thickening and wall motion and hypokinesis of the distal anterior wall. TID ratio 1.11    Impression:    1. ECG during the infusion did not change. 2. The myocardial perfusion imaging was abnormal.    The abnormality was a a moderate sized partially reversible defect in the distal anterior, anteroseptal and apical walls. 3. Overall left ventricular systolic function was normal without regional wall motion abnormalities. 4. No transient ischemic dilatation. 5. Intermediate risk general pharmacologic stress test.    Thank you for sending your patient to this [employer].      Electronically signed by Yoli Herrera MD on 12/10/21 at 2:41 PM EST

## 2021-12-16 ENCOUNTER — TELEPHONE (OUTPATIENT)
Dept: CARDIOLOGY CLINIC | Age: 81
End: 2021-12-16

## 2022-01-05 ENCOUNTER — OFFICE VISIT (OUTPATIENT)
Dept: CARDIOLOGY CLINIC | Age: 82
End: 2022-01-05
Payer: MEDICARE

## 2022-01-05 VITALS
WEIGHT: 202 LBS | DIASTOLIC BLOOD PRESSURE: 70 MMHG | HEART RATE: 65 BPM | HEIGHT: 62 IN | BODY MASS INDEX: 37.17 KG/M2 | SYSTOLIC BLOOD PRESSURE: 128 MMHG

## 2022-01-05 DIAGNOSIS — I10 ESSENTIAL HYPERTENSION: Primary | ICD-10-CM

## 2022-01-05 PROCEDURE — 99214 OFFICE O/P EST MOD 30 MIN: CPT | Performed by: INTERNAL MEDICINE

## 2022-01-05 PROCEDURE — 93000 ELECTROCARDIOGRAM COMPLETE: CPT | Performed by: INTERNAL MEDICINE

## 2022-01-05 RX ORDER — METOPROLOL SUCCINATE 50 MG/1
50 TABLET, EXTENDED RELEASE ORAL 2 TIMES DAILY
Qty: 180 TABLET | Refills: 3 | Status: SHIPPED | OUTPATIENT
Start: 2022-01-05

## 2022-01-05 RX ORDER — NITROGLYCERIN 0.4 MG/1
TABLET SUBLINGUAL
Qty: 25 TABLET | Refills: 3 | Status: SHIPPED | OUTPATIENT
Start: 2022-01-05

## 2022-01-05 RX ORDER — HYDROCHLOROTHIAZIDE 25 MG/1
TABLET ORAL
Qty: 90 TABLET | Refills: 3 | Status: SHIPPED | OUTPATIENT
Start: 2022-01-05

## 2022-01-05 RX ORDER — ISOSORBIDE MONONITRATE 60 MG/1
60 TABLET, EXTENDED RELEASE ORAL DAILY
Qty: 90 TABLET | Refills: 3 | Status: SHIPPED | OUTPATIENT
Start: 2022-01-05

## 2022-01-05 NOTE — PROGRESS NOTES
CHIEF COMPLAINT: Chest pain/CAD-CABG/Abnormal EKG    HPI: Patient is a 80 y.o.  seen at the request of Ava Jerez DO. Patient seen in follow up. Patient was seen for chest pain which resulted in a cath showing multivessel CAD. She underwent CABG 95/9/39 but had a complicated post-op course due to a wound infection. No CP or SOB.      Past Medical History:   Diagnosis Date    Arthritis     Asthma     Atrial fibrillation ([facility] 2016    Atrial flutter (HCC)     CAD (coronary artery disease)     CHB (complete heart block) ([facility]     Diabetes mellitus ([facility]     Hyperlipidemia     no med    Hypertension     LBBB (left bundle branch block)     Sleep apnea     Cpap setting 10    Symptomatic bradycardia     Thyroid disease        Patient Active Problem List   Diagnosis    FLORECITA on CPAP    Hypersomnolence    Diabetes mellitus ([facility]    Essential hypertension    Sleep apnea    Coronary artery disease involving native coronary artery of native heart with angina pectoris ([facility]    Hypothyroid    Asthma    Hyperlipidemia    H/O CABG x 4 (10-8-15: Dr. Cabrera Petite -> LAD, Free CHRISTINA -> D2, SVG -> OM3, SVG -> RCA)    Postoperative AV block    Postoperative atrial fibrillation (HCC)    Status post cardiac pacemaker placement (1-23-16: Dr. Gomez Player)   Alise Draft Non morbid obesity due to excess calories    Type 1 diabetes mellitus without complication ([facility]    Surgical (sternal) wound infection (Oct. 2015)    Surgical (sternal) wound, non healing (Oct. 2015) - 11-17-15:  Excisional debridment, L pectoralis advancement flap w complex closure Danny Jackson MD)    Colitis    SIRS (systemic inflammatory response syndrome) (HCC)    Paroxysmal atrial fibrillation (HCC) - on apixiban    Complete heart block ([address]    Pacemaker    Pacemaker-dependent due to native cardiac rhythm insufficient to support life       Allergies   Allergen Reactions    Asa [Aspirin] Hives    Sulfa Antibiotics Nausea And Vomiting Current Outpatient Medications   Medication Sig Dispense Refill    Cholecalciferol (VITAMIN D) 50 MCG (2000 UT) CAPS capsule Take 2,000 Units by mouth daily      ramipril (ALTACE) 10 MG capsule TAKE 1 CAPSULE BY MOUTH EVERY DAY 90 capsule 3    isosorbide mononitrate (IMDUR) 30 MG extended release tablet TAKE 1 TABLET BY MOUTH EVERY DAY (Patient taking differently: 60 mg ) 90 tablet 3    allopurinol (ZYLOPRIM) 100 MG tablet Take 100 mg by mouth daily      apixaban (ELIQUIS) 5 MG TABS tablet TAKE 1 TABLET BY MOUTH TWICE A  tablet 3    furosemide (LASIX) 20 MG tablet TAKE 1 TABLET BY MOUTH DAILY AS NEEDED FOR SWELLING OR ABDOMINAL BLOATING 90 tablet 3    hydroCHLOROthiazide (HYDRODIURIL) 25 MG tablet TAKE 1 TABLET BY MOUTH EVERY DAY 90 tablet 3    metoprolol succinate (TOPROL XL) 50 MG extended release tablet Take 1 tablet by mouth 2 times daily 180 tablet 3    nitroGLYCERIN (NITROSTAT) 0.4 MG SL tablet PLEASE SEE ATTACHED FOR DETAILED DIRECTIONS 25 tablet 3    PROAIR  (90 BASE) MCG/ACT inhaler Inhale 2 puffs into the lungs as needed       potassium chloride (KLOR-CON M) 20 MEQ extended release tablet TAKE 1 TABLET BY MOUTH ONCE DAILY AS NEEDED( FOR USE WITH FUROSEMIDE) IF TAKING WATER PILL 90 tablet 1    levothyroxine (SYNTHROID) 150 MCG tablet Take 1 tablet by mouth daily (Patient taking differently: Take 125 mcg by mouth daily Take 125 mcg daily) 30 tablet 3    albuterol (PROVENTIL) (2.5 MG/3ML) 0.083% nebulizer solution Take 2.5 mg by nebulization every 4 hours as needed for Wheezing      insulin NPH (HUMULIN N;NOVOLIN N) 100 UNIT/ML injection Inject into the skin 2 times daily 52 units am  52 units pm       No current facility-administered medications for this visit.        Social History     Socioeconomic History    Marital status:      Spouse name: Not on file    Number of children: Not on file    Years of education: Not on file    Highest education level: Not on file Occupational History    Not on file   Tobacco Use    Smoking status: Never Smoker    Smokeless tobacco: Never Used   Vaping Use    Vaping Use: Never used   Substance and Sexual Activity    Alcohol use: Yes     Alcohol/week: 0.0 standard drinks     Comment: very rarely 1 social drink    Drug use: No    Sexual activity: Not on file   Other Topics Concern    Not on file   Social History Narrative    Not on file     Social Determinants of Health     Financial Resource Strain:     Difficulty of Paying Living Expenses: Not on file   Food Insecurity:     Worried About Running Out of Food in the Last Year: Not on file    Jeremy of Food in the Last Year: Not on file   Transportation Needs:     Lack of Transportation (Medical): Not on file    Lack of Transportation (Non-Medical):  Not on file   Physical Activity:     Days of Exercise per Week: Not on file    Minutes of Exercise per Session: Not on file   Stress:     Feeling of Stress : Not on file   Social Connections:     Frequency of Communication with Friends and Family: Not on file    Frequency of Social Gatherings with Friends and Family: Not on file    Attends [employer] Services: Not on file    Active Member of [address] [place] [address] or Organizations: Not on file    Attends Club or Organization Meetings: Not on file    Marital Status: Not on file   Intimate Partner Violence:     Fear of Current or Ex-Partner: Not on file    Emotionally Abused: Not on file    Physically Abused: Not on file    Sexually Abused: Not on file   Housing Stability:     Unable to Pay for Housing in the Last Year: Not on file    Number of [place] in the Last Year: Not on file    Unstable Housing in the Last Year: Not on file       Family History   Problem Relation Age of Onset    Cancer Mother         colon    Diabetes Father     Cancer Father         lung cancer    Heart Failure Father     Heart Failure Sister     Diabetes Sister     Heart Disease Sister         ACB in 50's, MI@ 60     Review of Systems:  Heart: as above   Lungs: as above   Eyes: denies changes in vision or discharge. Ears: denies changes in hearing or pain. Nose: denies epistaxis or masses   Throat: denies sore throat or trouble swallowing. Neuro: denies numbness, tingling, tremors. Skin: denies rashes or itching. : denies hematuria, dysuria   GI: denies vomiting, diarrhea   Psych: denies mood changed, anxiety, depression. Physical Exam   /70   Pulse 65   Ht 5' 2\" (1.575 m)   Wt 202 lb (91.6 kg)   BMI 36.95 kg/m²   Constitutional: Oriented to person, place, and time. Well-developed and well-nourished. No distress. Head: Normocephalic and atraumatic. Eyes: EOM are normal. Pupils are equal, round, and reactive to light. Neck: Normal range of motion. Neck supple. No hepatojugular reflux and no JVD present. Carotid bruit is not present. No tracheal deviation present. No thyromegaly present. Cardiovascular: Normal rate, regular rhythm, normal heart sounds and intact distal pulses. Exam reveals no gallop and no friction rub. No murmur heard. Pulmonary/Chest: Effort normal and breath sounds normal. No respiratory distress. No wheezes. No rales. No tenderness. Abdominal: Soft. Bowel sounds are normal. No distension and no mass. No tenderness. No rebound and no guarding. Musculoskeletal: Normal range of motion. No edema and no tenderness. Lymphadenopathy:   No cervical adenopathy. No groin adenopathy. Neurological: Alert and oriented to person, place, and time. Skin: Skin is warm and dry. No rash noted. Not diaphoretic. No erythema. Psychiatric: Normal mood and affect. Behavior is normal.     EKG:  NSR, A-V paced.     Echo Summary 1/22/17:   Left ventricle is normal in size .   Normal left ventricle wall thickness   Septal motion consistent with post open heart state   Ejection fraction is visually estimated at 75%.   There is doppler evidence of stage I diastolic dysfunction.   Normal right ventricular size and function.   The left atrium is mildly dilated.   Focal calcification mitral valve leaflets   Mild mitral annular calcification.   Mild mitral regurgitation is present.   The aortic valve appears mildly sclerotic.   Aortic root is sclerotic and calcified    Echo Summary 4/8/2021:   LVEF 60%. Normal left ventricle size and systolic function. Stage I diastolic dysfunction. Mild mitral regurgitation. ASSESSMENT AND PLAN:  Patient Active Problem List   Diagnosis    FLORECITA on CPAP    Hypersomnolence    Diabetes mellitus [facility]    Essential hypertension    Sleep apnea    Coronary artery disease involving native coronary artery of native heart with angina pectoris ([facility]    Hypothyroid    Asthma    Hyperlipidemia    H/O CABG x 4 (10-8-15: Dr. Huber Mccoy -> LAD, Free CHRISTINA -> D2, SVG -> OM3, SVG -> RCA)    Postoperative AV block    Postoperative atrial fibrillation (HCC)    Status post cardiac pacemaker placement (1-23-16: Dr. Benjy Toscano)   Murrel Neither Non morbid obesity due to excess calories    [facility]    Surgical (sternal) wound infection (Oct. 2015)    Surgical (sternal) wound, non healing (Oct. 2015) - 11-17-15:  Excisional debridment, L pectoralis advancement flap w complex closure Jono Rosales MD)    Colitis    SIRS (systemic inflammatory response syndrome) (HCC)    Paroxysmal atrial fibrillation (HCC) - on apixiban    Complete heart block ([facility]    Pacemaker    Pacemaker-dependent due to native cardiac rhythm insufficient to support life     1. Chest pain/CAD/Status post CABG:    Pharm stress with moderately sized partially reversible defect in the distal anterior, anteroseptal and apical walls. Overall low risk. Continue coreg/ramipril/imdur(titrated). Rash with ASA and on Eliquis. Intolerant to statin. 3. PAF: In sinus. BB and eliquis. 4. Lipids: Intolerant to atorvastatin. 5. HTN: Observe.     6. Pacer: Per EP. 7. LBBB: Chronic. 8. DM: Per PCP. Ernesto Wilcox D.O.   Cardiologist  Cardiology, [address]

## 2022-01-13 ENCOUNTER — HOSPITAL ENCOUNTER (OUTPATIENT)
Age: 82
Discharge: HOME | End: 2022-01-13
Payer: MEDICARE

## 2022-01-13 LAB
ALBUMIN SERPL-MCNC: 4.1 G/DL (ref 3.5–5.2)
ALP BLD-CCNC: 112 U/L (ref 35–104)
ALT SERPL-CCNC: 986 U/L (ref 0–32)
ANION GAP SERPL CALCULATED.3IONS-SCNC: 12 MMOL/L (ref 7–16)
AST SERPL-CCNC: 24 U/L (ref 0–31)
BASOPHILS ABSOLUTE: 0.03 E9/L (ref 0–0.2)
BASOPHILS RELATIVE PERCENT: 0.4 % (ref 0–2)
BILIRUB SERPL-MCNC: 0.6 MG/DL (ref 0–1.2)
BUN BLDV-MCNC: 24 MG/DL (ref 6–23)
CALCIUM SERPL-MCNC: 9.5 MG/DL (ref 8.6–10.2)
CHLORIDE BLD-SCNC: 99 MMOL/L (ref 98–107)
CHOLESTEROL, TOTAL: 241 MG/DL (ref 0–199)
CO2: 29 MMOL/L (ref 22–29)
CREAT SERPL-MCNC: 0.9 MG/DL (ref 0.5–1)
CREATININE URINE: 256 MG/DL (ref 29–226)
EOSINOPHILS ABSOLUTE: 0.21 E9/L (ref 0.05–0.5)
EOSINOPHILS RELATIVE PERCENT: 2.8 % (ref 0–6)
GFR AFRICAN AMERICAN: >60
GFR NON-AFRICAN AMERICAN: >60 ML/MIN/1.73
GLUCOSE BLD-MCNC: 229 MG/DL (ref 74–99)
HBA1C MFR BLD: 9 % (ref 4–5.6)
HCT VFR BLD CALC: 43.7 % (ref 34–48)
HDLC SERPL-MCNC: 55 MG/DL
HEMOGLOBIN: 14.4 G/DL (ref 11.5–15.5)
IMMATURE GRANULOCYTES #: 0.02 E9/L
IMMATURE GRANULOCYTES %: 0.3 % (ref 0–5)
LDL CHOLESTEROL CALCULATED: 165 MG/DL (ref 0–99)
LYMPHOCYTES ABSOLUTE: 2.09 E9/L (ref 1.5–4)
LYMPHOCYTES RELATIVE PERCENT: 27.8 % (ref 20–42)
MCH RBC QN AUTO: 29.2 PG (ref 26–35)
MCHC RBC AUTO-ENTMCNC: 33 % (ref 32–34.5)
MCV RBC AUTO: 88.6 FL (ref 80–99.9)
MICROALBUMIN UR-MCNC: <12 MG/L
MICROALBUMIN/CREAT UR-RTO: ABNORMAL (ref 0–30)
MONOCYTES ABSOLUTE: 0.5 E9/L (ref 0.1–0.95)
MONOCYTES RELATIVE PERCENT: 6.6 % (ref 2–12)
NEUTROPHILS ABSOLUTE: 4.68 E9/L (ref 1.8–7.3)
NEUTROPHILS RELATIVE PERCENT: 62.1 % (ref 43–80)
PDW BLD-RTO: 12.8 FL (ref 11.5–15)
PLATELET # BLD: 167 E9/L (ref 130–450)
PMV BLD AUTO: 9.2 FL (ref 7–12)
POTASSIUM SERPL-SCNC: 3.6 MMOL/L (ref 3.5–5)
RBC # BLD: 4.93 E12/L (ref 3.5–5.5)
SODIUM BLD-SCNC: 140 MMOL/L (ref 132–146)
TOTAL CK: 204 U/L (ref 20–180)
TOTAL PROTEIN: 6.6 G/DL (ref 6.4–8.3)
TRIGL SERPL-MCNC: 104 MG/DL (ref 0–149)
VLDLC SERPL CALC-MCNC: 21 MG/DL
WBC # BLD: 7.5 E9/L (ref 4.5–11.5)

## 2022-01-13 PROCEDURE — 80053 COMPREHEN METABOLIC PANEL: CPT

## 2022-01-13 PROCEDURE — 36415 COLL VENOUS BLD VENIPUNCTURE: CPT

## 2022-01-13 PROCEDURE — 83036 HEMOGLOBIN GLYCOSYLATED A1C: CPT

## 2022-01-13 PROCEDURE — 80061 LIPID PANEL: CPT

## 2022-01-13 PROCEDURE — 82550 ASSAY OF CK (CPK): CPT

## 2022-01-13 PROCEDURE — 82044 UR ALBUMIN SEMIQUANTITATIVE: CPT

## 2022-01-13 PROCEDURE — 85025 COMPLETE CBC W/AUTO DIFF WBC: CPT

## 2022-01-13 PROCEDURE — 82570 ASSAY OF URINE CREATININE: CPT

## 2022-01-27 ENCOUNTER — HOSPITAL ENCOUNTER (OUTPATIENT)
Age: 82
Discharge: HOME | End: 2022-01-27
Payer: MEDICARE

## 2022-01-27 LAB
ALBUMIN SERPL-MCNC: 4.1 G/DL (ref 3.5–5.2)
ALP BLD-CCNC: 94 U/L (ref 35–104)
ALT SERPL-CCNC: 32 U/L (ref 0–32)
ANION GAP SERPL CALCULATED.3IONS-SCNC: 12 MMOL/L (ref 7–16)
AST SERPL-CCNC: 32 U/L (ref 0–31)
BILIRUB SERPL-MCNC: 0.5 MG/DL (ref 0–1.2)
BUN BLDV-MCNC: 20 MG/DL (ref 6–23)
CALCIUM SERPL-MCNC: 9.2 MG/DL (ref 8.6–10.2)
CHLORIDE BLD-SCNC: 101 MMOL/L (ref 98–107)
CO2: 29 MMOL/L (ref 22–29)
CREAT SERPL-MCNC: 1 MG/DL (ref 0.5–1)
GFR AFRICAN AMERICAN: >60
GFR NON-AFRICAN AMERICAN: 53 ML/MIN/1.73
GLUCOSE BLD-MCNC: 105 MG/DL (ref 74–99)
POTASSIUM SERPL-SCNC: 3.6 MMOL/L (ref 3.5–5)
SODIUM BLD-SCNC: 142 MMOL/L (ref 132–146)
TOTAL CK: 435 U/L (ref 20–180)
TOTAL PROTEIN: 6.5 G/DL (ref 6.4–8.3)

## 2022-01-27 PROCEDURE — 82550 ASSAY OF CK (CPK): CPT

## 2022-01-27 PROCEDURE — 80053 COMPREHEN METABOLIC PANEL: CPT

## 2022-01-27 PROCEDURE — 36415 COLL VENOUS BLD VENIPUNCTURE: CPT

## 2022-02-01 ENCOUNTER — HOSPITAL ENCOUNTER (OUTPATIENT)
Age: 82
Discharge: HOME | End: 2022-02-01
Payer: MEDICARE

## 2022-02-01 LAB
ANTI JO-1 IGG: NEGATIVE
C-REACTIVE PROTEIN: 0.3 MG/DL (ref 0–0.4)
ENA TO RNP ANTIBODY: NEGATIVE
ENA TO SMITH (SM) ANTIBODY: NEGATIVE
ENA TO SSA (RO) ANTIBODY: NEGATIVE
ENA TO SSB (LA) ANTIBODY: NEGATIVE
SCLERODERMA (SCL-70) AB: NEGATIVE
SEDIMENTATION RATE, ERYTHROCYTE: 27 MM/HR (ref 0–20)
TOTAL CK: 311 U/L (ref 20–180)

## 2022-02-01 PROCEDURE — 85651 RBC SED RATE NONAUTOMATED: CPT

## 2022-02-01 PROCEDURE — 82550 ASSAY OF CK (CPK): CPT

## 2022-02-01 PROCEDURE — 86235 NUCLEAR ANTIGEN ANTIBODY: CPT

## 2022-02-01 PROCEDURE — 83516 IMMUNOASSAY NONANTIBODY: CPT

## 2022-02-01 PROCEDURE — 86140 C-REACTIVE PROTEIN: CPT

## 2022-02-01 PROCEDURE — 36415 COLL VENOUS BLD VENIPUNCTURE: CPT

## 2022-02-17 LAB
Lab: NORMAL
REPORT: NORMAL
THIS TEST SENT TO: NORMAL

## 2022-03-03 ENCOUNTER — PREP FOR PROCEDURE (OUTPATIENT)
Dept: SURGERY | Age: 82
End: 2022-03-03

## 2022-03-03 RX ORDER — LEVOTHYROXINE SODIUM 137 UG/1
137 TABLET ORAL DAILY
COMMUNITY

## 2022-03-03 RX ORDER — SODIUM CHLORIDE 0.9 % (FLUSH) 0.9 %
5-40 SYRINGE (ML) INJECTION PRN
Status: CANCELLED | OUTPATIENT
Start: 2022-03-03

## 2022-03-03 RX ORDER — SODIUM CHLORIDE 9 MG/ML
INJECTION, SOLUTION INTRAVENOUS CONTINUOUS
Status: CANCELLED | OUTPATIENT
Start: 2022-03-03

## 2022-03-03 RX ORDER — ALBUTEROL SULFATE 90 UG/1
2 AEROSOL, METERED RESPIRATORY (INHALATION) EVERY 6 HOURS PRN
COMMUNITY

## 2022-03-03 RX ORDER — SODIUM CHLORIDE 0.9 % (FLUSH) 0.9 %
5-40 SYRINGE (ML) INJECTION EVERY 12 HOURS SCHEDULED
Status: CANCELLED | OUTPATIENT
Start: 2022-03-03

## 2022-03-03 NOTE — H&P (VIEW-ONLY)
Name: Monica Hernandez             : 1940 Sex: F  Age: 80 yrs  Acct#:  [id number]              CC:  Muscle pain    HPI: [The patient is a 77-year-old white female who presents with an increased sedimentation rate and CK level. The patient complains of muscle pain. The patient states that she has had the discomfort since she received her COVID vaccination. The patient denies weight loss. ]    Meds Prior to Visit:  Proair HFA     Toprol XL     Eliquis     Hydrochlorothiazide     Levothyroxine Sodium     Ramipril     Albuterol Sulfate     Furosemide     Novolin R     Isosorbide Mononitrate ER     Nitroglycerin     Potassium Chloride ER     Allopurinol     Vitamin D3        Allergies:  Sulfa Antibiotics, Aspirin        Wt Prior: 202lb as of 21    Exam:    Lungs are clear to auscultation  Cardiac regular rate and rhythm without murmurs or gallops  Abdomen is soft, nondistended and nontender  Extremities without clubbing cyanosis or edema         Assessment #1: Hx M62.81 Muscle weakness (generalized)   Care Plan:              Comments       : The patient will undergo a right thigh muscle biopsy. I have informed her of the risks, benefits and complications of the procedure and she is willing to proceed. Moderate complexity    I performed an updated history, physical examination, assessment and plan.              Seen by:

## 2022-03-03 NOTE — PROGRESS NOTES
Have you been tested for COVID  No           Have you been told you were positive for COVID No  Have you had any known exposure to someone that is positive for COVID No  Do you have a cough                   No              Do you have shortness of breath No                 Do you have a sore throat            No                Are you having chills                    No                Are you having muscle aches. No                    Please come to the hospital wearing a mask and have your significant other wear a mask as well. Both of you should check your temperature before leaving to come here,  if it is 100 or higher please call [phone] for instruction. Amanda     PRE-ADMISSION TESTING INSTRUCTIONS      ARRIVAL INSTRUCTIONS:  [x] Parking the day of Surgery is located in the Main Entrance lot. Upon entering the main door make an immediate right to the surgery reception desk. [x] Bring photo ID and insurance card    [x] Bring in a copy of Living will or Durable Power of  papers.     [x] Please be sure to arrange for responsible adult to provide transportation to and from the hospital    [x] Please arrange for responsible adult to be with you for the 24 hour period post procedure due to having anesthesia      GENERAL INSTRUCTIONS:    [x] Nothing by mouth after midnight, including gum, candy, mints or water    [x] You may brush your teeth, but do not swallow any water    [x] Take medications as instructed with 1-2 oz of water    [x] Stop herbal supplements and vitamins 5 days prior to procedure    [x] Follow preop dosing of blood thinners per physician instructions    [x] Take 1/2 dose of evening insulin, but no insulin after midnight    [x] No oral diabetic medications after midnight    [x] If diabetic and have low blood sugar or feel symptomatic, take 1-2oz apple juice only    [x] Shower or bath with soap, lather and rinse well, AM of Surgery, no lotion, powders or creams to surgical site    [x] No alcohol or illegal drug use within 24 hours of surgery.     [x] Jewelry, body piercing's, eyeglasses, contact lenses and dentures are not permitted into surgery (bring cases)      [x] Please do not wear any nail polish, make up or hair products on the day of surgery    [x] You can expect a call the business day prior to procedure to notify you if your arrival time changes    [x] If you receive a survey after surgery we would greatly appreciate your comments    [x] Please notify surgeon if you develop any illness between now and time of surgery (cold, cough, sore throat, fever, nausea, vomiting) or any signs of infections  including skin, wounds, and dental.    [x]  The Outpatient Pharmacy is available to fill your prescription here on your day of surgery, ask your preop nurse for details

## 2022-03-04 ENCOUNTER — ANESTHESIA (OUTPATIENT)
Dept: OPERATING ROOM | Age: 82
End: 2022-03-04
Payer: MEDICARE

## 2022-03-04 ENCOUNTER — HOSPITAL ENCOUNTER (OUTPATIENT)
Age: 82
Setting detail: OUTPATIENT SURGERY
Discharge: HOME | End: 2022-03-04
Attending: SURGERY | Admitting: SURGERY
Payer: MEDICARE

## 2022-03-04 ENCOUNTER — ANESTHESIA EVENT (OUTPATIENT)
Dept: OPERATING ROOM | Age: 82
End: 2022-03-04
Payer: MEDICARE

## 2022-03-04 VITALS
SYSTOLIC BLOOD PRESSURE: 141 MMHG | WEIGHT: 190 LBS | OXYGEN SATURATION: 93 % | RESPIRATION RATE: 20 BRPM | DIASTOLIC BLOOD PRESSURE: 67 MMHG | TEMPERATURE: 97 F | HEIGHT: 62 IN | BODY MASS INDEX: 34.96 KG/M2 | HEART RATE: 60 BPM

## 2022-03-04 VITALS
RESPIRATION RATE: 20 BRPM | OXYGEN SATURATION: 99 % | SYSTOLIC BLOOD PRESSURE: 115 MMHG | DIASTOLIC BLOOD PRESSURE: 55 MMHG

## 2022-03-04 LAB
METER GLUCOSE: 103 MG/DL (ref 74–99)
METER GLUCOSE: 87 MG/DL (ref 74–99)

## 2022-03-04 PROCEDURE — 2500000003 HC RX 250 WO HCPCS: Performed by: SURGERY

## 2022-03-04 PROCEDURE — 3700000000 HC ANESTHESIA ATTENDED CARE: Performed by: SURGERY

## 2022-03-04 PROCEDURE — 6360000002 HC RX W HCPCS: Performed by: ANESTHESIOLOGY

## 2022-03-04 PROCEDURE — 7100000011 HC PHASE II RECOVERY - ADDTL 15 MIN: Performed by: SURGERY

## 2022-03-04 PROCEDURE — 2500000003 HC RX 250 WO HCPCS: Performed by: ANESTHESIOLOGY

## 2022-03-04 PROCEDURE — 6360000002 HC RX W HCPCS: Performed by: NURSE ANESTHETIST, CERTIFIED REGISTERED

## 2022-03-04 PROCEDURE — 7100000010 HC PHASE II RECOVERY - FIRST 15 MIN: Performed by: SURGERY

## 2022-03-04 PROCEDURE — 3600000012 HC SURGERY LEVEL 2 ADDTL 15MIN: Performed by: SURGERY

## 2022-03-04 PROCEDURE — 2500000003 HC RX 250 WO HCPCS: Performed by: NURSE ANESTHETIST, CERTIFIED REGISTERED

## 2022-03-04 PROCEDURE — 88305 TISSUE EXAM BY PATHOLOGIST: CPT

## 2022-03-04 PROCEDURE — 2580000003 HC RX 258: Performed by: SURGERY

## 2022-03-04 PROCEDURE — 2709999900 HC NON-CHARGEABLE SUPPLY: Performed by: SURGERY

## 2022-03-04 PROCEDURE — 3700000001 HC ADD 15 MINUTES (ANESTHESIA): Performed by: SURGERY

## 2022-03-04 PROCEDURE — 3600000002 HC SURGERY LEVEL 2 BASE: Performed by: SURGERY

## 2022-03-04 PROCEDURE — 6360000002 HC RX W HCPCS: Performed by: SURGERY

## 2022-03-04 PROCEDURE — 82962 GLUCOSE BLOOD TEST: CPT

## 2022-03-04 RX ORDER — DIPHENHYDRAMINE HYDROCHLORIDE 50 MG/ML
12.5 INJECTION INTRAMUSCULAR; INTRAVENOUS
Status: DISCONTINUED | OUTPATIENT
Start: 2022-03-04 | End: 2022-03-04 | Stop reason: HOSPADM

## 2022-03-04 RX ORDER — SODIUM CHLORIDE 0.9 % (FLUSH) 0.9 %
5-40 SYRINGE (ML) INJECTION EVERY 12 HOURS SCHEDULED
Status: DISCONTINUED | OUTPATIENT
Start: 2022-03-04 | End: 2022-03-04 | Stop reason: HOSPADM

## 2022-03-04 RX ORDER — SODIUM CHLORIDE 0.9 % (FLUSH) 0.9 %
5-40 SYRINGE (ML) INJECTION PRN
Status: DISCONTINUED | OUTPATIENT
Start: 2022-03-04 | End: 2022-03-04 | Stop reason: HOSPADM

## 2022-03-04 RX ORDER — PROCHLORPERAZINE EDISYLATE 5 MG/ML
5 INJECTION INTRAMUSCULAR; INTRAVENOUS
Status: DISCONTINUED | OUTPATIENT
Start: 2022-03-04 | End: 2022-03-04 | Stop reason: HOSPADM

## 2022-03-04 RX ORDER — FENTANYL CITRATE 50 UG/ML
25 INJECTION, SOLUTION INTRAMUSCULAR; INTRAVENOUS EVERY 5 MIN PRN
Status: DISCONTINUED | OUTPATIENT
Start: 2022-03-04 | End: 2022-03-04 | Stop reason: HOSPADM

## 2022-03-04 RX ORDER — LIDOCAINE HYDROCHLORIDE 20 MG/ML
INJECTION, SOLUTION INFILTRATION; PERINEURAL PRN
Status: DISCONTINUED | OUTPATIENT
Start: 2022-03-04 | End: 2022-03-04 | Stop reason: SDUPTHER

## 2022-03-04 RX ORDER — SODIUM CHLORIDE 9 MG/ML
25 INJECTION, SOLUTION INTRAVENOUS PRN
Status: DISCONTINUED | OUTPATIENT
Start: 2022-03-04 | End: 2022-03-04 | Stop reason: HOSPADM

## 2022-03-04 RX ORDER — SODIUM CHLORIDE 9 MG/ML
INJECTION, SOLUTION INTRAVENOUS CONTINUOUS
Status: DISCONTINUED | OUTPATIENT
Start: 2022-03-04 | End: 2022-03-04 | Stop reason: HOSPADM

## 2022-03-04 RX ORDER — LIDOCAINE HYDROCHLORIDE 10 MG/ML
INJECTION, SOLUTION INFILTRATION; PERINEURAL PRN
Status: DISCONTINUED | OUTPATIENT
Start: 2022-03-04 | End: 2022-03-04 | Stop reason: ALTCHOICE

## 2022-03-04 RX ORDER — FENTANYL CITRATE 50 UG/ML
INJECTION, SOLUTION INTRAMUSCULAR; INTRAVENOUS PRN
Status: DISCONTINUED | OUTPATIENT
Start: 2022-03-04 | End: 2022-03-04 | Stop reason: SDUPTHER

## 2022-03-04 RX ORDER — GLYCOPYRROLATE 0.2 MG/ML
INJECTION INTRAMUSCULAR; INTRAVENOUS PRN
Status: DISCONTINUED | OUTPATIENT
Start: 2022-03-04 | End: 2022-03-04 | Stop reason: SDUPTHER

## 2022-03-04 RX ORDER — MEPERIDINE HYDROCHLORIDE 25 MG/ML
12.5 INJECTION INTRAMUSCULAR; INTRAVENOUS; SUBCUTANEOUS EVERY 5 MIN PRN
Status: DISCONTINUED | OUTPATIENT
Start: 2022-03-04 | End: 2022-03-04 | Stop reason: HOSPADM

## 2022-03-04 RX ORDER — METOCLOPRAMIDE HYDROCHLORIDE 5 MG/ML
10 INJECTION INTRAMUSCULAR; INTRAVENOUS ONCE
Status: COMPLETED | OUTPATIENT
Start: 2022-03-04 | End: 2022-03-04

## 2022-03-04 RX ORDER — PROPOFOL 10 MG/ML
INJECTION, EMULSION INTRAVENOUS PRN
Status: DISCONTINUED | OUTPATIENT
Start: 2022-03-04 | End: 2022-03-04 | Stop reason: SDUPTHER

## 2022-03-04 RX ADMIN — METOCLOPRAMIDE HYDROCHLORIDE 10 MG: 5 INJECTION INTRAMUSCULAR; INTRAVENOUS at 09:48

## 2022-03-04 RX ADMIN — GLYCOPYRROLATE 0.2 MG: 0.2 INJECTION INTRAMUSCULAR; INTRAVENOUS at 11:20

## 2022-03-04 RX ADMIN — FENTANYL CITRATE 50 MCG: 50 INJECTION, SOLUTION INTRAMUSCULAR; INTRAVENOUS at 11:17

## 2022-03-04 RX ADMIN — LIDOCAINE HYDROCHLORIDE 60 MG: 20 INJECTION, SOLUTION INFILTRATION; PERINEURAL at 11:24

## 2022-03-04 RX ADMIN — FAMOTIDINE 20 MG: 10 INJECTION, SOLUTION INTRAVENOUS at 09:48

## 2022-03-04 RX ADMIN — FENTANYL CITRATE 50 MCG: 50 INJECTION, SOLUTION INTRAMUSCULAR; INTRAVENOUS at 11:38

## 2022-03-04 RX ADMIN — SODIUM CHLORIDE: 9 INJECTION, SOLUTION INTRAVENOUS at 11:17

## 2022-03-04 RX ADMIN — Medication 2000 MG: at 11:20

## 2022-03-04 RX ADMIN — PROPOFOL 50 MCG/KG/MIN: 10 INJECTION, EMULSION INTRAVENOUS at 11:25

## 2022-03-04 RX ADMIN — PROPOFOL 40 MG: 10 INJECTION, EMULSION INTRAVENOUS at 11:37

## 2022-03-04 RX ADMIN — PROPOFOL 50 MG: 10 INJECTION, EMULSION INTRAVENOUS at 11:24

## 2022-03-04 ASSESSMENT — PULMONARY FUNCTION TESTS
PIF_VALUE: 1
PIF_VALUE: 0
PIF_VALUE: 1

## 2022-03-04 ASSESSMENT — PAIN DESCRIPTION - DESCRIPTORS: DESCRIPTORS: ACHING;DISCOMFORT

## 2022-03-04 ASSESSMENT — PAIN - FUNCTIONAL ASSESSMENT
PAIN_FUNCTIONAL_ASSESSMENT: 0-10
PAIN_FUNCTIONAL_ASSESSMENT: PREVENTS OR INTERFERES SOME ACTIVE ACTIVITIES AND ADLS

## 2022-03-04 ASSESSMENT — PAIN DESCRIPTION - ORIENTATION
ORIENTATION: RIGHT
ORIENTATION: RIGHT

## 2022-03-04 ASSESSMENT — PAIN SCALES - GENERAL
PAINLEVEL_OUTOF10: 0
PAINLEVEL_OUTOF10: 0

## 2022-03-04 ASSESSMENT — LIFESTYLE VARIABLES: SMOKING_STATUS: 0

## 2022-03-04 ASSESSMENT — PAIN DESCRIPTION - PROGRESSION
CLINICAL_PROGRESSION: NOT CHANGED
CLINICAL_PROGRESSION: NOT CHANGED

## 2022-03-04 ASSESSMENT — PAIN DESCRIPTION - PAIN TYPE
TYPE: SURGICAL PAIN
TYPE: SURGICAL PAIN

## 2022-03-04 ASSESSMENT — PAIN DESCRIPTION - LOCATION: LOCATION: OTHER (COMMENT)

## 2022-03-04 NOTE — OP NOTE
Operative Note      Patient: Margot Tijerina  YOB: 1940  MRN: [id number]    Date of Procedure: 3/4/2022    Pre-Op Diagnosis: ABNORMAL LABS    Post-Op Diagnosis: Same       Procedure(s): MUSCLE BIOPSY RIGHT THIGH    Surgeon(s):  aJsson Dodge MD    Assistant:   Resident: Gemini Robles MD; Sushma John DO    Anesthesia: Monitor Anesthesia Care    Estimated Blood Loss (mL): Minimal    Complications: None    Specimens:   ID Type Source Tests Collected by Time Destination   A : right thigh muscle biopsy long = top short=bottom Tissue Tissue SURGICAL PATHOLOGY Jasson Dodge MD 3/4/2022 1147        Implants:  * No implants in log *      Drains: * No LDAs found *    Findings: As expected    Detailed Description of Procedure:     BRIEF HISTORY: The patient presented with muscle pains and elevated sedimentation rate along with elevated CK levels. I discussed muscle biopsy with the patient including the procedure, benefits, risks and alternatives, and the patient agreed. PROCEDURE IN DETAIL:   The patient was brought to the operating room and positioned supine on the OR table. Sequential compression devices were placed on the patient's lower extremities and functioning. Preoperative antibiotics were administered. Anesthesia was obtained without complication as per the anesthesia record. Immediately prior to the procedure a time-out was called and the surgical checklist was reviewed and agreed upon by all present. The patient's right thigh was prepped and draped in usual sterile fashion    A 4 cm longitudinal incision was made with a 15 blade over the lateral thigh. Electrocautery was used to dissect through the subcutaneous tissue. The tensor fascia kendall was divided the muscle belly was identified. Muscle fibers were isolated and tied proximally and distally with silk ties. The muscle was divided sharply distally to the silk ties and the specimen was removed and sent for pathology.   Hemostasis was obtained using electrocautery. The wound was then irrigated with sterile saline. The tensor fascia kendall was closed using 2-0 Vicryl suture in a running fashion. 3-0 Vicryl suture was used in a deep dermal fashion followed by a running 4-0 Vicryl subcuticular stitch. Lidocaine was used as a local anesthetic. Needle, sponge, and instrument counts were reported as correct x2. The patient tolerated the procedure well without complications and was transferred to the recovery area in good condition. Dr. Massimo Dye was present for the case. Edyta Espinal MD  Surgery Resident PGY-4  3/4/2022  12:11 PM    This document is generated, in part, by voice recognition software and thus syntax and grammatical errors are possible.

## 2022-03-04 NOTE — ANESTHESIA POSTPROCEDURE EVALUATION
Department of Anesthesiology  Postprocedure Note    Patient: Pam Faith  MRN: [id number]  YOB: 1940  Date of evaluation: 3/4/2022  Time:  12:18 PM     Procedure Summary     Date: 03/04/22 Room / Location: [address]    Anesthesia Start: 1114 Anesthesia Stop: 1218    Procedure: MUSCLE BIOPSY RIGHT THIGH (Right ) Diagnosis: (ABNORMAL LABS)    Surgeons: Rajeev Mendes MD Responsible Provider: Geoffrey Vaz DO    Anesthesia Type: MAC ASA Status: 4          Anesthesia Type: MAC    Seth Phase I: Seth Score: 10    Seth Phase II:      Last vitals: Reviewed and per EMR flowsheets. Anesthesia Post Evaluation    Patient location during evaluation: PACU  Patient participation: complete - patient participated  Level of consciousness: awake and alert  Pain score: 1  Airway patency: patent  Nausea & Vomiting: no nausea and no vomiting  Complications: no  Cardiovascular status: blood pressure returned to baseline  Respiratory status: acceptable  Hydration status: euvolemic  Comments: Baseline spo2 was 83-84 % on ra in or. Seen and examined in phase II. Progressing as expected. No questions or concerns.

## 2022-03-04 NOTE — PROGRESS NOTES
PT RECEIVED IN PACU 2 FROM SURGERY REPORT RECEIVED ASSESSMENT AND VS OBTAINED NOURISHMENT GIVEN GLUCOSE 87 FAMILY CALLED TO BE WITH PT.  1310 DISCHARGE INSTRUCTIONS GIVEN TO PT AND FAMILY PER LEAH HERNANDEZ RN AT THIS TIME

## 2022-03-04 NOTE — ANESTHESIA PRE PROCEDURE
Department of Anesthesiology  Preprocedure Note       Name:  Ant Dueñas   Age:  80 y.o.  :  1940                                          MRN:  [id number]         Date:  3/4/2022      Surgeon: Curtis Milton):  Maninder Oneill MD    Procedure: Procedure(s): MUSCLE BIOPSY RIGHT THIGH    Medications prior to admission:   Prior to Admission medications    Medication Sig Start Date End Date Taking?  Authorizing Provider   levothyroxine (SYNTHROID) 137 MCG tablet Take 137 mcg by mouth Daily   Yes Historical Provider, MD   albuterol sulfate HFA (VENTOLIN HFA) 108 (90 Base) MCG/ACT inhaler Inhale 2 puffs into the lungs every 6 hours as needed for Wheezing   Yes Historical Provider, MD   apixaban (ELIQUIS) 5 MG TABS tablet TAKE 1 TABLET BY MOUTH TWICE A DAY 22  Yes Jostin Ruiz DO   isosorbide mononitrate (IMDUR) 60 MG extended release tablet Take 1 tablet by mouth daily 22  Yes Jostin Ruiz DO   nitroGLYCERIN (NITROSTAT) 0.4 MG SL tablet PLEASE SEE ATTACHED FOR DETAILED DIRECTIONS 22  Yes Jostin Ruiz DO   hydroCHLOROthiazide (HYDRODIURIL) 25 MG tablet TAKE 1 TABLET BY MOUTH EVERY DAY 22  Yes Jostin Ruiz DO   metoprolol succinate (TOPROL XL) 50 MG extended release tablet Take 1 tablet by mouth 2 times daily 22  Yes Jostin Ruiz DO   ramipril (ALTACE) 10 MG capsule TAKE 1 CAPSULE BY MOUTH EVERY DAY 10/8/21  Yes Jostin Ruiz DO   allopurinol (ZYLOPRIM) 100 MG tablet Take 100 mg by mouth daily   Yes Historical Provider, MD   furosemide (LASIX) 20 MG tablet TAKE 1 TABLET BY MOUTH DAILY AS NEEDED FOR SWELLING OR ABDOMINAL BLOATING 20  Yes Jostin Ruiz DO   potassium chloride (KLOR-CON M) 20 MEQ extended release tablet TAKE 1 TABLET BY MOUTH ONCE DAILY AS NEEDED( FOR USE WITH FUROSEMIDE) IF TAKING WATER PILL 17  Yes SHALOM Platt - CNP   albuterol (PROVENTIL) (2.5 MG/3ML) 0.083% nebulizer solution Take 2.5 mg by nebulization every 4 hours as needed for Wheezing   Yes Historical Provider, MD   insulin NPH (HUMULIN N;NOVOLIN N) 100 UNIT/ML injection Inject into the skin 2 times daily 52 units am  52 units pm   Yes Historical Provider, MD   Cholecalciferol (VITAMIN D) 50 MCG (2000 UT) CAPS capsule Take 2,000 Units by mouth daily    Historical Provider, MD       Current medications:    No current facility-administered medications for this encounter. Allergies:     Allergies   Allergen Reactions    Asa [Aspirin] Hives    Sulfa Antibiotics Nausea And Vomiting       Problem List:    Patient Active Problem List   Diagnosis Code    FLORECITA on CPAP G47.33, Z99.89    Hypersomnolence G47.10    Diabetes mellitus ([facility] E11.9    Essential hypertension I10    Sleep apnea G47.30    Coronary artery disease involving native coronary artery of native heart with angina pectoris [facility] I25.119    Hypothyroid E03.9    Asthma J45.909    Hyperlipidemia E78.5    H/O CABG x 4 (10-8-15: Dr. Ott Emilee -> LAD, Free CHRISTINA -> D2, SVG -> OM3, SVG -> RCA) Z95.1    Postoperative AV block I97.89, I44.30    Postoperative atrial fibrillation (HCC) I97.89, I48.91    Status post cardiac pacemaker placement (1-23-16: Dr. Guillermo Ward) Z95.0    Non morbid obesity due to excess calories E66.09    Type 1 diabetes mellitus without complication (HCC) B14.0    Surgical (sternal) wound infection (Oct. 2015) T81.49XA    Surgical (sternal) wound, non healing (Oct. 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, Z95.0       Past Medical History:        Diagnosis Date    Arthritis     Asthma     Atrial fibrillation ([facility] 2016    Atrial flutter ([facility]     CAD (coronary artery disease)     [facility]complete [facility] block) [facility]     Diabetes mellitus ([facility]     Hyperlipidemia     no med    Hypertension     LBBB (left bundle branch block)     Sleep apnea     Cpap setting 10    Symptomatic bradycardia     Thyroid disease        Past Surgical History:        Procedure Laterality Date    BACK SURGERY  2004     Laminectomy    CARDIAC CATHETERIZATION  10/2/2015    Dr Valerie Holly  2017    CHOLECYSTECTOMY      2012    COLONOSCOPY  2015    CORONARY ARTERY BYPASS GRAFT  10/8/15    x4    EYE SURGERY      2014 Bilateral cataract surgery with lense implants    FRACTURE SURGERY Right 2006     ankle     HYSTERECTOMY      PACEMAKER PLACEMENT Left 01/23/2017    Dr Abel Venegas GLAND SURGERY  2004 2002    STERNUM DEBRIDEMENT  11/11/2015    WITH WOUND VAC PLACEMENT    STERNUM DEBRIDEMENT  11/17/2015    DEBRIDEMENT OF NON-HEALING STERNAL WOUND WITH WASHOUT & CLOSURE       Social History:    Social History     Tobacco Use    Smoking status: Never Smoker    Smokeless tobacco: Never Used   Substance Use Topics    Alcohol use: Yes     Alcohol/week: 0.0 standard drinks     Comment: very rarely 1 social drink                                Counseling given: Not Answered      Vital Signs (Current):   Vitals:    03/03/22 1021   Weight: 190 lb (86.2 kg)   Height: 5' 2\" (1.575 m)                                              BP Readings from Last 3 Encounters:   01/05/22 128/70   12/10/21 (!) 142/64   11/30/21 126/70       NPO Status:                                                                                 BMI:   Wt Readings from Last 3 Encounters:   03/03/22 190 lb (86.2 kg)   01/05/22 202 lb (91.6 kg)   12/10/21 204 lb (92.5 kg)     Body mass index is 34.75 kg/m².     CBC:   Lab Results   Component Value Date    WBC 7.5 01/13/2022    RBC 4.93 01/13/2022    HGB 14.4 01/13/2022    HCT 43.7 01/13/2022    MCV 88.6 reviewed       Beta Blocker:  Dose within 24 Hrs      ROS comment: EKG:  Sinus  Rhythm  -First degree A-V block   Roger = 280  Intraventricular conduction defect -consider left ventricular hypertrophy. Extensive anterior-lateral infarct  Old  -Superior axis -may be secondary to infarct. Nonspecific T-abnormality. STRESS:  Gated SPECT left ventricular ejection fraction was calculated to be 73%, with normal myocardial thickening and wall motion and hypokinesis of the distal anterior wall. TID ratio 1.11     Impression:    1. ECG during the infusion did not change. 2.The myocardial perfusion imaging was abnormal.    The abnormality was a a moderate sized partially reversible defect in the distal anterior, anteroseptal and apical walls. 3.Overall left ventricular systolic function was normal without regional wall motion abnormalities. 4.No transient ischemic dilatation. 5. Intermediate risk general pharmacologic stress test.     Neuro/Psych:   Negative Neuro/Psych ROS     (-) seizures, TIA and CVA           GI/Hepatic/Renal:   (+) renal disease (Creatinine 1.0 & GFR 53): CRI,      (-) hepatitis       Endo/Other:    (+) Diabetes ( mg/dL this am)Type II DM, using insulin, hypothyroidism, blood dyscrasia (Eliquis LD 3/1): anticoagulation therapy, arthritis: OA., . Pt had no PAT visit        ROS comment: Gout Abdominal:             Vascular: negative vascular ROS. - DVT and PE. Other Findings:           Anesthesia Plan      MAC     ASA 4       Induction: intravenous. MIPS: Postoperative opioids intended and Prophylactic antiemetics administered. Anesthetic plan and risks discussed with patient. Plan discussed with CRNA. Loren Nj DO   3/4/2022      Plan reviewed and agree.  SHALOM Vazquez - CRNA

## 2022-03-05 NOTE — INTERVAL H&P NOTE
Update History & Physical    The patient's History and Physical of March 4, 2022 was reviewed with the patient and I examined the patient. There was no change. The surgical site was confirmed by the patient and me. Plan: The risks, benefits, expected outcome, and alternative to the recommended procedure have been discussed with the patient. Patient understands and wants to proceed with the procedure.      Electronically signed by Sofía Barakat MD on 3/5/2022 at 8:58 AM

## 2022-04-08 ENCOUNTER — HOSPITAL ENCOUNTER (OUTPATIENT)
Age: 82
Discharge: HOME | End: 2022-04-08
Payer: MEDICARE

## 2022-04-08 LAB
ALBUMIN SERPL-MCNC: 4.3 G/DL (ref 3.5–5.2)
ALP BLD-CCNC: 107 U/L (ref 35–104)
ALT SERPL-CCNC: 26 U/L (ref 0–32)
ANION GAP SERPL CALCULATED.3IONS-SCNC: 13 MMOL/L (ref 7–16)
AST SERPL-CCNC: 26 U/L (ref 0–31)
BASOPHILS ABSOLUTE: 0.02 E9/L (ref 0–0.2)
BASOPHILS RELATIVE PERCENT: 0.3 % (ref 0–2)
BILIRUB SERPL-MCNC: 0.6 MG/DL (ref 0–1.2)
BUN BLDV-MCNC: 24 MG/DL (ref 6–23)
CALCIUM SERPL-MCNC: 9.5 MG/DL (ref 8.6–10.2)
CHLORIDE BLD-SCNC: 99 MMOL/L (ref 98–107)
CHOLESTEROL, TOTAL: 180 MG/DL (ref 0–199)
CO2: 29 MMOL/L (ref 22–29)
CREAT SERPL-MCNC: 1 MG/DL (ref 0.5–1)
CREATININE URINE: 121 MG/DL (ref 29–226)
EOSINOPHILS ABSOLUTE: 0.3 E9/L (ref 0.05–0.5)
EOSINOPHILS RELATIVE PERCENT: 4.2 % (ref 0–6)
GFR AFRICAN AMERICAN: >60
GFR NON-AFRICAN AMERICAN: 53 ML/MIN/1.73
GLUCOSE BLD-MCNC: 125 MG/DL (ref 74–99)
HCT VFR BLD CALC: 43.8 % (ref 34–48)
HDLC SERPL-MCNC: 49 MG/DL
HEMOGLOBIN: 14.2 G/DL (ref 11.5–15.5)
IMMATURE GRANULOCYTES #: 0.02 E9/L
IMMATURE GRANULOCYTES %: 0.3 % (ref 0–5)
LABORATORY INFORMATION: NORMAL
LDL CHOLESTEROL CALCULATED: 105 MG/DL (ref 0–99)
LYMPHOCYTES ABSOLUTE: 1.88 E9/L (ref 1.5–4)
LYMPHOCYTES RELATIVE PERCENT: 26.3 % (ref 20–42)
MCH RBC QN AUTO: 29 PG (ref 26–35)
MCHC RBC AUTO-ENTMCNC: 32.4 % (ref 32–34.5)
MCV RBC AUTO: 89.6 FL (ref 80–99.9)
MICROALBUMIN UR-MCNC: <12 MG/L
MICROALBUMIN/CREAT UR-RTO: ABNORMAL (ref 0–30)
MONOCYTES ABSOLUTE: 0.54 E9/L (ref 0.1–0.95)
MONOCYTES RELATIVE PERCENT: 7.6 % (ref 2–12)
NEUTROPHILS ABSOLUTE: 4.38 E9/L (ref 1.8–7.3)
NEUTROPHILS RELATIVE PERCENT: 61.3 % (ref 43–80)
PDW BLD-RTO: 12.8 FL (ref 11.5–15)
PLATELET # BLD: 170 E9/L (ref 130–450)
PMV BLD AUTO: 10 FL (ref 7–12)
POTASSIUM SERPL-SCNC: 3.8 MMOL/L (ref 3.5–5)
RBC # BLD: 4.89 E12/L (ref 3.5–5.5)
SODIUM BLD-SCNC: 141 MMOL/L (ref 132–146)
T4 FREE: 1.25 NG/DL (ref 0.93–1.7)
TOTAL CK: 273 U/L (ref 20–180)
TOTAL PROTEIN: 6.9 G/DL (ref 6.4–8.3)
TRIGL SERPL-MCNC: 131 MG/DL (ref 0–149)
TROPONIN, HIGH SENSITIVITY: ABNORMAL NG/L (ref 0–9)
TSH SERPL DL<=0.05 MIU/L-ACNC: 2.4 UIU/ML (ref 0.27–4.2)
VLDLC SERPL CALC-MCNC: 26 MG/DL
WBC # BLD: 7.1 E9/L (ref 4.5–11.5)

## 2022-04-08 PROCEDURE — 36415 COLL VENOUS BLD VENIPUNCTURE: CPT

## 2022-04-08 PROCEDURE — 84443 ASSAY THYROID STIM HORMONE: CPT

## 2022-04-08 PROCEDURE — 82044 UR ALBUMIN SEMIQUANTITATIVE: CPT

## 2022-04-08 PROCEDURE — 85025 COMPLETE CBC W/AUTO DIFF WBC: CPT

## 2022-04-08 PROCEDURE — 80061 LIPID PANEL: CPT

## 2022-04-08 PROCEDURE — 82652 VIT D 1 25-DIHYDROXY: CPT

## 2022-04-08 PROCEDURE — 84439 ASSAY OF FREE THYROXINE: CPT

## 2022-04-08 PROCEDURE — 82570 ASSAY OF URINE CREATININE: CPT

## 2022-04-08 PROCEDURE — 82550 ASSAY OF CK (CPK): CPT

## 2022-04-08 PROCEDURE — 80053 COMPREHEN METABOLIC PANEL: CPT

## 2022-04-12 LAB — VITAMIN D 1,25-DIHYDROXY: 26.3 PG/ML (ref 19.9–79.3)

## 2022-07-21 ENCOUNTER — OFFICE VISIT (OUTPATIENT)
Dept: CARDIOLOGY CLINIC | Age: 82
End: 2022-07-21
Payer: MEDICARE

## 2022-07-21 VITALS
HEIGHT: 62 IN | BODY MASS INDEX: 36.86 KG/M2 | HEART RATE: 66 BPM | SYSTOLIC BLOOD PRESSURE: 138 MMHG | DIASTOLIC BLOOD PRESSURE: 60 MMHG | WEIGHT: 200.3 LBS | RESPIRATION RATE: 18 BRPM

## 2022-07-21 DIAGNOSIS — I25.119 CORONARY ARTERY DISEASE INVOLVING NATIVE CORONARY ARTERY OF NATIVE HEART WITH ANGINA PECTORIS (HCC): Primary | ICD-10-CM

## 2022-07-21 PROCEDURE — 1123F ACP DISCUSS/DSCN MKR DOCD: CPT | Performed by: INTERNAL MEDICINE

## 2022-07-21 PROCEDURE — 99214 OFFICE O/P EST MOD 30 MIN: CPT | Performed by: INTERNAL MEDICINE

## 2022-07-21 PROCEDURE — 93000 ELECTROCARDIOGRAM COMPLETE: CPT | Performed by: INTERNAL MEDICINE

## 2022-07-21 RX ORDER — CELECOXIB 100 MG/1
CAPSULE ORAL
COMMUNITY
Start: 2022-06-13

## 2022-07-21 RX ORDER — SEMAGLUTIDE 1.34 MG/ML
INJECTION, SOLUTION SUBCUTANEOUS
COMMUNITY
Start: 2022-07-07

## 2022-07-21 NOTE — PROGRESS NOTES
CHIEF COMPLAINT: Chest pain/CAD-CABG/Abnormal EKG    HPI: Patient is a 80 y.o.  seen at the request of Ava Jerez DO. Patient seen in follow up. Patient was seen for chest pain which resulted in a cath showing multivessel CAD. She underwent CABG 01/3/26 but had a complicated post-op course due to a wound infection. No CP or SOB.      Past Medical History:   Diagnosis Date    Arthritis     Asthma     Atrial fibrillation ([facility] 2016    Atrial flutter (HCC)     CAD (coronary artery disease)     CHB (complete heart block) (HCC)     Di[facility]     Hyperlipidemia     no med    Hypertension     LBBB (left bundle branch block)     Sleep apnea     Cpap setting 10    Symptomatic bradycardia     Thyroid disease        Patient Active Problem List   Diagnosis    FLORECITA on CPAP    Hypersomnolence    Diabetes mellitus ([facility]    Essential hypertension    Sleep apnea    Coronary artery disease involving native coronary artery of native heart with angina pectoris (HCC)    Hypothyroid    Asthma    Hyperlipidemia    H/O CABG x 4 (10-8-15: Dr. Tish White -> LAD, Free CHRISTINA -> D2, SVG -> OM3, SVG -> RCA)    Postoperative AV block    Postoperative atrial fibrillation (HCC)    Status post cardiac pacemaker placement (1-23-16: Dr. Daley Going)    Non morbid obesity due to excess calories    Type 1 diabetes mellitus without complication ([facility]    Surgical (sternal) wound infection (Oct. 2015)    Surgical (sternal) wound, non healing (Oct. 2015) - 11-17-15:  Excisional debridment, L pectoralis advancement flap w complex closure Francisca Durant MD)    Colitis    SIRS (systemic inflammatory response syndrome) (HCC)    Paroxysmal atrial fibrillation ([facility] - on apixiban    Complete heart block (HCC)    Pacemaker    Pacemaker-dependent due to native cardiac rhythm insufficient to support life       Allergies   Allergen Reactions    Asa [Aspirin] Hives    Sulfa Antibiotics Nausea And Vomiting       Current Outpatient Medications Medication Sig Dispense Refill    OZEMPIC, 0.25 OR 0.5 MG/DOSE, 2 MG/1.5ML SOPN       celecoxib (CELEBREX) 100 MG capsule TAKE 1 CAPSULE BY MOUTH EVERY DAY FOR 90 DAYS      levothyroxine (SYNTHROID) 137 MCG tablet Take 137 mcg by mouth Daily      albuterol sulfate HFA (PROVENTIL;VENTOLIN;PROAIR) 108 (90 Base) MCG/ACT inhaler Inhale 2 puffs into the lungs every 6 hours as needed for Wheezing      apixaban (ELIQUIS) 5 MG TABS tablet TAKE 1 TABLET BY MOUTH TWICE A  tablet 3    isosorbide mononitrate (IMDUR) 60 MG extended release tablet Take 1 tablet by mouth daily 90 tablet 3    nitroGLYCERIN (NITROSTAT) 0.4 MG SL tablet PLEASE SEE ATTACHED FOR DETAILED DIRECTIONS 25 tablet 3    hydroCHLOROthiazide (HYDRODIURIL) 25 MG tablet TAKE 1 TABLET BY MOUTH EVERY DAY 90 tablet 3    metoprolol succinate (TOPROL XL) 50 MG extended release tablet Take 1 tablet by mouth 2 times daily 180 tablet 3    Cholecalciferol (VITAMIN D) 50 MCG (2000 UT) CAPS capsule Take 2,000 Units by mouth daily      ramipril (ALTACE) 10 MG capsule TAKE 1 CAPSULE BY MOUTH EVERY DAY 90 capsule 3    allopurinol (ZYLOPRIM) 100 MG tablet Take 100 mg by mouth daily      furosemide (LASIX) 20 MG tablet TAKE 1 TABLET BY MOUTH DAILY AS NEEDED FOR SWELLING OR ABDOMINAL BLOATING 90 tablet 3    potassium chloride (KLOR-CON M) 20 MEQ extended release tablet TAKE 1 TABLET BY MOUTH ONCE DAILY AS NEEDED( FOR USE WITH FUROSEMIDE) IF TAKING WATER PILL 90 tablet 1    albuterol (PROVENTIL) (2.5 MG/3ML) 0.083% nebulizer solution Take 2.5 mg by nebulization every 4 hours as needed for Wheezing      insulin NPH (HUMULIN N;NOVOLIN N) 100 UNIT/ML injection Inject into the skin 2 times daily 50 units am  30 units pm       No current facility-administered medications for this visit. Social History     Socioeconomic History    Marital status:       Spouse name: Not on file    Number of children: Not on file    Years of education: Not on file    Highest education level: Not on file   Occupational History    Not on file   Tobacco Use    Smoking status: Never    Smokeless tobacco: Never   Vaping Use    Vaping Use: Never used   Substance and Sexual Activity    Alcohol use: Yes     Alcohol/week: 0.0 standard drinks     Comment: very rarely 1 social drink    Drug use: No    Sexual activity: Not on file   Other Topics Concern    Not on file   Social History Narrative    Not on file     Social Determinants of Health     Financial Resource Strain: Not on file   Food Insecurity: Not on file   Transportation Needs: Not on file   Physical Activity: Not on file   Stress: Not on file   Social Connections: Not on file   Intimate Partner Violence: Not on file   Housing Stability: Not on file       Family History   Problem Relation Age of Onset    Cancer Mother         colon    Diabetes Father     Cancer Father         lung cancer    Heart Failure Father     Heart Failure Sister     Diabetes Sister     Heart Disease Sister         ACB in 52's, MI@ 61     Review of Systems:  Heart: as above   Lungs: as above   Eyes: denies changes in vision or discharge. Ears: denies changes in hearing or pain. Nose: denies epistaxis or masses   Throat: denies sore throat or trouble swallowing. Neuro: denies numbness, tingling, tremors. Skin: denies rashes or itching. : denies hematuria, dysuria   GI: denies vomiting, diarrhea   Psych: denies mood changed, anxiety, depression. Physical Exam   /60   Pulse 66   Resp 18   Ht 5' 2\" (1.575 m)   Wt 200 lb 4.8 oz (90.9 kg)   BMI 36.64 kg/m²   Constitutional: Oriented to person, place, and time. Well-developed and well-nourished. No distress. Head: Normocephalic and atraumatic. Eyes: EOM are normal. Pupils are equal, round, and reactive to light. Neck: Normal range of motion. Neck supple. No hepatojugular reflux and no JVD present. Carotid bruit is not present. No tracheal deviation present. No thyromegaly present.    Cardiovascular: Normal rate, regular rhythm, normal heart sounds and intact distal pulses. Exam reveals no gallop and no friction rub. No murmur heard. Pulmonary/Chest: Effort normal and breath sounds normal. No respiratory distress. No wheezes. No rales. No tenderness. Abdominal: Soft. Bowel sounds are normal. No distension and no mass. No tenderness. No rebound and no guarding. Musculoskeletal: Normal range of motion. No edema and no tenderness. Lymphadenopathy:   No cervical adenopathy. No groin adenopathy. Neurological: Alert and oriented to person, place, and time. Skin: Skin is warm and dry. No rash noted. Not diaphoretic. No erythema. Psychiatric: Normal mood and affect. Behavior is normal.     EKG:  NSR, A-V paced. Echo Summary 1/22/17:   Left ventricle is normal in size . Normal left ventricle wall thickness   Septal motion consistent with post open heart state   Ejection fraction is visually estimated at 75%. There is doppler evidence of stage I diastolic dysfunction. Normal right ventricular size and function. The left atrium is mildly dilated. Focal calcification mitral valve leaflets   Mild mitral annular calcification. Mild mitral regurgitation is present. The aortic valve appears mildly sclerotic. Aortic root is sclerotic and calcified    Echo Summary 4/8/2021:   LVEF 60%. Normal left ventricle size and systolic function. Stage I diastolic dysfunction. Mild mitral regurgitation.      ASSESSMENT AND PLAN:  Patient Active Problem List   Diagnosis    FLORECITA on CPAP    Hypersomnolence    Diabetes mellitus ([address]    Essential hypertension    Sleep apnea    Coronary artery disease involving native coronary artery of native heart with angina pectoris (HCC)    Hypothyroid    Asthma    Hyperlipidemia    H/O CABG x 4 (10-8-15: Dr. Gato Guerrero -> LAD, Free CHRISTINA -> D2, SVG -> OM3, SVG -> RCA)    Postoperative AV block    Postoperative atrial fibrillation ([address]    Status post cardiac pacemaker placement (1-23-16: Dr. Alana Tristan)    Non morbid obesity due to excess calories    Type 1 diabetes mellitus without complication ([facility]    Surgical (sternal) wound infection (Oct. 2015)    Surgical (sternal) wound, non healing (Oct. 2015) - 11-17-15:  Excisional debridment, L pectoralis advancement flap w complex closure Anika Delgado MD)    Colitis    SIRS (systemic inflammatory response syndrome) (HCC)    Paroxysmal atrial fibrillation ([facility] - on apixiban    Complete heart block [facility])    Pacemaker    Pacemaker-dependent due to native cardiac rhythm insufficient to support life     1. Chest pain/CAD/Status post CABG:    Pharm stress with moderately sized partially reversible defect in the distal anterior, anteroseptal and apical walls. Overall low risk. Continue coreg/ramipril/imdur(titrated). Rash with ASA and on Eliquis. Intolerant to statin. 3. PAF: In sinus. BB and eliquis. 4. Lipids: Intolerant to atorvastatin. 5. HTN: Observe. 6. Pacer: Per EP. 7. LBBB: Chronic. 8. DM: Per PCP. Km Rahman D.O.   Cardiologist  Cardiology, [facility]

## 2022-09-15 ENCOUNTER — OFFICE VISIT (OUTPATIENT)
Dept: NON INVASIVE DIAGNOSTICS | Age: 82
End: 2022-09-15
Payer: MEDICARE

## 2022-09-15 VITALS
WEIGHT: 199.4 LBS | SYSTOLIC BLOOD PRESSURE: 132 MMHG | BODY MASS INDEX: 36.7 KG/M2 | RESPIRATION RATE: 16 BRPM | HEIGHT: 62 IN | HEART RATE: 60 BPM | DIASTOLIC BLOOD PRESSURE: 64 MMHG

## 2022-09-15 DIAGNOSIS — I48.0 PAROXYSMAL ATRIAL FIBRILLATION (HCC): ICD-10-CM

## 2022-09-15 DIAGNOSIS — Z95.0 PACEMAKER: ICD-10-CM

## 2022-09-15 DIAGNOSIS — I44.2 COMPLETE HEART BLOCK (HCC): Primary | ICD-10-CM

## 2022-09-15 PROBLEM — K57.90 DIVERTICULAR DISEASE: Status: ACTIVE | Noted: 2022-09-15

## 2022-09-15 PROBLEM — H40.9 GLAUCOMA: Status: ACTIVE | Noted: 2022-09-15

## 2022-09-15 PROBLEM — M65.30 TRIGGERING OF DIGIT: Status: ACTIVE | Noted: 2022-09-15

## 2022-09-15 PROBLEM — M47.816 SPONDYLOSIS OF LUMBAR SPINE: Status: ACTIVE | Noted: 2022-09-15

## 2022-09-15 PROBLEM — I51.89 DIASTOLIC DYSFUNCTION: Status: ACTIVE | Noted: 2022-09-15

## 2022-09-15 PROBLEM — M19.90 OSTEOARTHRITIS: Status: ACTIVE | Noted: 2022-09-15

## 2022-09-15 PROBLEM — K29.70 GASTRITIS: Status: ACTIVE | Noted: 2022-09-15

## 2022-09-15 PROBLEM — I50.30 HEART FAILURE WITH PRESERVED EJECTION FRACTION (HCC): Status: ACTIVE | Noted: 2022-09-15

## 2022-09-15 PROBLEM — K63.5 POLYP OF COLON: Status: ACTIVE | Noted: 2022-09-15

## 2022-09-15 PROBLEM — R29.818 INTRAOCULAR PRESSURE FINDING: Status: ACTIVE | Noted: 2022-09-15

## 2022-09-15 PROBLEM — E66.9 OBESITY WITH BODY MASS INDEX 30 OR GREATER: Status: ACTIVE | Noted: 2022-09-15

## 2022-09-15 PROBLEM — E55.9 VITAMIN D DEFICIENCY: Status: ACTIVE | Noted: 2022-09-15

## 2022-09-15 PROBLEM — K80.20 CHOLELITHIASIS: Status: ACTIVE | Noted: 2022-09-15

## 2022-09-15 PROBLEM — M72.0 DUPUYTREN'S DISEASE: Status: ACTIVE | Noted: 2022-09-15

## 2022-09-15 PROCEDURE — 1123F ACP DISCUSS/DSCN MKR DOCD: CPT | Performed by: INTERNAL MEDICINE

## 2022-09-15 PROCEDURE — 99214 OFFICE O/P EST MOD 30 MIN: CPT | Performed by: INTERNAL MEDICINE

## 2022-09-15 PROCEDURE — 93000 ELECTROCARDIOGRAM COMPLETE: CPT | Performed by: INTERNAL MEDICINE

## 2022-09-15 ASSESSMENT — ENCOUNTER SYMPTOMS
COUGH: 0
COLOR CHANGE: 0
CHEST TIGHTNESS: 0
ABDOMINAL PAIN: 0
EYE REDNESS: 0
ABDOMINAL DISTENTION: 0
NAUSEA: 0
WHEEZING: 0
DIARRHEA: 0
SINUS PRESSURE: 0
SHORTNESS OF BREATH: 0

## 2022-09-15 NOTE — PROGRESS NOTES
Cardiac Electrophysiology Outpatient Progress Note    Emanuel Salas  1940  Date of Service: 9/15/2022  Referring Provider/PCP: Melissa Hess DO  Chief Complaint:   Chief Complaint   Patient presents with    Heart Problem     Overdue 6 month ov, BSCI PPM, Pt has no complaints today. HISTORY OF PRESENT ILLNESS    Emanuel Salas presents to the office today for the management of these Electrophysiology conditions:  CHB, symptomatic bradycardia, cLBBB S/p PPM 1/23/2017      This is a 80 y.o.  female with a history of DM, HTN, CAD, s/p CABG x4 in 10/15 with Dr. Marco Dejesus which was complicated by sternal wound infection which required a flap. She has known LBBB and post operatively was seen by Dr. Vickie Lieberman for AV block. Her AV block recovered quickly however. She had recurrent symptomatic complete heart block. She is now S/p left sided dual chamber BSCI PPM 1/23/2017      9/15/21 :She reports feeling overall well. She denies any chest pain, sob, dizziness, syncope. She works from home but tries to remain active. Her device site looks well healed and free from infection or erosion. She offers no complaints from a device POV. Currently denies any angina, syncope, dyspnea on exertion, paroxysmal nocturnal dyspnea and palpitations. The patient continues to be followed remotely. She continues to see Dr Alfredo Gregg with cardiology, she is intolerant to statins    9/15/22 : She denies any chest pain, sob, dizziness, syncope.       Patient Active Problem List    Diagnosis Date Noted    Cholelithiasis 09/15/2022     Priority: Medium    Diastolic dysfunction 42/93/5977     Priority: Medium    Diverticular disease 09/15/2022     Priority: Medium    Dupuytren's disease 09/15/2022     Priority: Medium    Gastritis 09/15/2022     Priority: Medium    Glaucoma 09/15/2022     Priority: Medium    Heart failure with preserved ejection fraction ([address] 09/15/2022     Priority: Medium    Intraocular pressure finding 09/15/2022     Priority: Medium    Obesity with body mass index 30 or greater 09/15/2022     Priority: Medium    Osteoarthritis 09/15/2022     Priority: Medium    Polyp of colon 09/15/2022     Priority: Medium    Spondylosis of lumbar spine 09/15/2022     Priority: Medium    Triggering of digit 09/15/2022     Priority: Medium    Vitamin D deficiency 09/15/2022     Priority: Medium    Benign neoplasm of parathyroid gland 02/23/2005     Priority: Medium    Pacemaker-dependent due to native cardiac rhythm insufficient to support life 12/07/2017    Pacemaker 08/24/2017    Complete heart block ([address] 01/22/2017    Paroxysmal atrial fibrillation ([address] - on apixiban 01/16/2016    Colitis 11/21/2015    SIRS (systemic inflammatory response syndrome) ([address] 11/21/2015    Surgical (sternal) wound, non healing (Oct. 2015) - 11-17-15:  Excisional debridment, L pectoralis advancement flap w complex closure Gala Johnson MD) 11/18/2015    Surgical (sternal) wound infection (Oct. 2015) 11/12/2015    Non morbid obesity due to excess calories 10/12/2015    Type 1 diabetes mellitus without complication ([address]     Status post cardiac pacemaker placement (1-23-16: Dr. Jodi Murphy)     Postoperative atrial fibrillation ([address]     Hypothyroid 10/09/2015    Asthma 10/09/2015    Hyperlipidemia 10/09/2015    H/O CABG x 4 (10-8-15: Dr. Jocy Stroud -> LAD, Free CHRISTINA -> D2, SVG -> OM3, SVG -> RCA) 10/09/2015    Postoperative AV block     Coronary artery disease involving native coronary artery of native heart with angina pectoris ([address] 10/02/2015    Diabetes mellitus ([address]     Essential hypertension     Sleep apnea      Overview Note:     Cpap      FLORECITA on CPAP 05/22/2015     Overview Note:     DME - BMS       Hypersomnolence 05/22/2015       Family History   Problem Relation Age of Onset    Cancer Mother         colon    Diabetes Father     Cancer Father         lung cancer    Heart Failure Father     Heart Failure Sister     Diabetes Sister     Heart Disease Sister         ACB in 52's, MI@ 61         Past Surgical History:   Procedure Laterality Date    BACK SURGERY  2004     Laminectomy    CARDIAC CATHETERIZATION  10/2/2015    Dr Vazquez Casie  2017    CHOLECYSTECTOMY      2012    COLONOSCOPY  2015    CORONARY ARTERY BYPASS GRAFT  10/8/15    x4    EYE SURGERY      2014 Bilateral cataract surgery with lense implants    FRACTURE SURGERY Right 2006     ankle     HYSTERECTOMY (CERVIX STATUS UNKNOWN)      LEG BIOPSY EXCISION Right 3/4/2022    MUSCLE BIOPSY RIGHT THIGH performed by Joseph Calvillo MD at [address] Left 01/23/2017    Dr Stan Mora  2004 2002    STERNUM DEBRIDEMENT  11/11/2015    WITH WOUND VAC PLACEMENT    STERNUM DEBRIDEMENT  11/17/2015    DEBRIDEMENT OF NON-HEALING STERNAL WOUND WITH WASHOUT & CLOSURE       Current Outpatient Medications   Medication Sig Dispense Refill    CPAP Machine MISC by Does not apply route      OZEMPIC, 0.25 OR 0.5 MG/DOSE, 2 MG/1.5ML SOPN       celecoxib (CELEBREX) 100 MG capsule TAKE 1 CAPSULE BY MOUTH EVERY DAY FOR 90 DAYS      levothyroxine (SYNTHROID) 137 MCG tablet Take 137 mcg by mouth Daily      albuterol sulfate HFA (PROVENTIL;VENTOLIN;PROAIR) 108 (90 Base) MCG/ACT inhaler Inhale 2 puffs into the lungs every 6 hours as needed for Wheezing      apixaban (ELIQUIS) 5 MG TABS tablet TAKE 1 TABLET BY MOUTH TWICE A  tablet 3    isosorbide mononitrate (IMDUR) 60 MG extended release tablet Take 1 tablet by mouth daily 90 tablet 3    nitroGLYCERIN (NITROSTAT) 0.4 MG SL tablet PLEASE SEE ATTACHED FOR DETAILED DIRECTIONS 25 tablet 3    hydroCHLOROthiazide (HYDRODIURIL) 25 MG tablet TAKE 1 TABLET BY MOUTH EVERY DAY 90 tablet 3    metoprolol succinate (TOPROL XL) 50 MG extended release tablet Take 1 tablet by mouth 2 times daily 180 tablet 3    Cholecalciferol (VITAMIN D) 50 MCG (2000 UT) CAPS capsule Take 2,000 Units by mouth daily      ramipril (ALTACE) 10 MG capsule TAKE 1 CAPSULE BY MOUTH EVERY DAY 90 capsule 3    allopurinol (ZYLOPRIM) 100 MG tablet Take 100 mg by mouth daily      furosemide (LASIX) 20 MG tablet TAKE 1 TABLET BY MOUTH DAILY AS NEEDED FOR SWELLING OR ABDOMINAL BLOATING 90 tablet 3    potassium chloride (KLOR-CON M) 20 MEQ extended release tablet TAKE 1 TABLET BY MOUTH ONCE DAILY AS NEEDED( FOR USE WITH FUROSEMIDE) IF TAKING WATER PILL 90 tablet 1    albuterol (PROVENTIL) (2.5 MG/3ML) 0.083% nebulizer solution Take 2.5 mg by nebulization every 4 hours as needed for Wheezing      insulin NPH (HUMULIN N;NOVOLIN N) 100 UNIT/ML injection Inject into the skin 2 times daily 50 units am  30 units pm       No current facility-administered medications for this visit. Allergies   Allergen Reactions    Asa [Aspirin] Hives    Sulfa Antibiotics Nausea And Vomiting           ROS:   Review of Systems   Constitutional:  Negative for fatigue and fever. HENT:  Negative for congestion, nosebleeds and sinus pressure. Eyes:  Negative for redness and visual disturbance. Respiratory:  Negative for cough, chest tightness, shortness of breath and wheezing. Cardiovascular:  Negative for chest pain, palpitations and leg swelling. Gastrointestinal:  Negative for abdominal distention, abdominal pain, diarrhea and nausea. Endocrine: Negative for cold intolerance, heat intolerance, polydipsia and polyphagia. Genitourinary:  Negative for difficulty urinating, frequency and urgency. Musculoskeletal:  Negative for arthralgias, joint swelling and myalgias. Skin:  Negative for color change and wound. Neurological:  Negative for dizziness, syncope, weakness and numbness. Psychiatric/Behavioral:  Negative for agitation, behavioral problems, confusion, decreased concentration, hallucinations and suicidal ideas. The patient is not nervous/anxious.           PHYSICAL EXAM:  Vitals:    09/15/22 1319   BP: 132/64   Site: Left Upper Arm   Position: Sitting   Cuff Size: Medium Adult   Pulse: 60   Resp: 16   Weight: 199 lb 6.4 oz (90.4 kg)   Height: 5' 2\" (1.575 m)     Physical Exam  Vitals reviewed. Constitutional:       Appearance: Normal appearance. HENT:      Head: Normocephalic. Mouth/Throat:      Mouth: Mucous membranes are moist.      Pharynx: Oropharynx is clear. Eyes:      Conjunctiva/sclera: Conjunctivae normal.   Neck:      Vascular: No carotid bruit. Cardiovascular:      Rate and Rhythm: Normal rate and regular rhythm. Pulses: Normal pulses. Heart sounds: Normal heart sounds. Pulmonary:      Effort: Pulmonary effort is normal.      Breath sounds: Normal breath sounds. No rales. Chest:      Chest wall: No tenderness. Abdominal:      General: Bowel sounds are normal.      Palpations: Abdomen is soft. Musculoskeletal:         General: Normal range of motion. Cervical back: Normal range of motion and neck supple. Skin:     General: Skin is warm. Neurological:      General: No focal deficit present. Mental Status: She is alert and oriented to person, place, and time. Psychiatric:         Mood and Affect: Mood normal.         Behavior: Behavior normal.         Thought Content:  Thought content normal.        Pertinent Labs:  CBC:   WBC (E9/L)   Date Value   04/08/2022 7.1   01/13/2022 7.5   07/26/2021 8.3     Hemoglobin (g/dL)   Date Value   04/08/2022 14.2   01/13/2022 14.4   07/26/2021 14.1     Hematocrit (%)   Date Value   04/08/2022 43.8   01/13/2022 43.7   07/26/2021 42.9     Platelets (X0/M)   Date Value   04/08/2022 170   01/13/2022 167   07/26/2021 169      BMP:   Sodium (mmol/L)   Date Value   04/08/2022 141   01/27/2022 142   01/13/2022 140     Potassium (mmol/L)   Date Value   04/08/2022 3.8   01/27/2022 3.6   01/13/2022 3.6     Potassium reflex Magnesium (mmol/L)   Date Value   05/23/2020 4.0     Magnesium (mg/dL)   Date Value   10/12/2015 2.9 (H)   10/11/2015 2.8 (H)   10/10/2015 the device      4/2019 Stress test  Gated SPECT left ventricular ejection fraction was calculated to   be 77%, with hypokinesis of the apical anterior wall. Impression:     1. ECG during the infusion did not change. 2. The myocardial perfusion imaging was abnormal.  The   abnormality was a a small sized fixed defect in the apical   anterior wall suggestive of a prior MI   3. Overall left ventricular systolic function was abnormal with   regional wall motion abnormalities. 4. Low risk pre-operative pharmacologic stress test.    TTE 1/2017  Summary   Left ventricle is normal in size . Normal left ventricle wall thickness   Septal motion consistent with post open heart state   Ejection fraction is visually estimated at 75%. There is doppler evidence of stage I diastolic dysfunction. Normal right ventricular size and function. The left atrium is mildly dilated. Focal calcification mitral valve leaflets   Mild mitral annular calcification. Mild mitral regurgitation is present. The aortic valve appears mildly sclerotic. Aortic root is sclerotic and calcified    4/8/21 TTE   Summary   Normal left ventricle size and systolic function. Stage I diastolic dysfunction. Mild mitral regurgitation. 12/2021 Stress Test  Gated SPECT left ventricular ejection fraction was calculated to be 73%, with normal myocardial thickening and wall motion and hypokinesis of the distal anterior wall. TID ratio 1.11     Impression:    ECG during the infusion did not change. The myocardial perfusion imaging was abnormal.    The abnormality was a a moderate sized partially reversible defect in the distal anterior, anteroseptal and apical walls. Overall left ventricular systolic function was normal without regional wall motion abnormalities. No transient ischemic dilatation.   Intermediate risk general pharmacologic stress test.      I have independently reviewed all of the ECGs and rhythm strips per above    I have personally reviewed the laboratory, cardiac diagnostic and radiographic testing as outlined above: We have requested previous records. 1. Complete heart block ([address]    2. Pacemaker    3. Paroxysmal atrial fibrillation (HCC)         ASSESSMENT & PLAN    1. Complete heart block  - S/p dual chamber PPM   - 100% RV paced, Repeat TTE 4/8/21 shows normal LV function     2. S/p PPM   - DOI 1/23/2017  - dual chamber ; BSCI   - see device reprogramming above      3. PAF  - paroxysmal  - 0% on device interrogaton  -  On Eliquis, denies any bleeding issues   - receiving BB      4. CAD  - S/p CABG + wound infection with sternal debridement + flap  - Stress test 2019 detailed above  - Follows Dr. Elaine Ernandez as an outpatient  (7/2022)     5. HTN  -  Well controlled at this visit  - continue current regiment  - follow with close BP log     6. DM    7. FLORECITA   - continue CPAP     7. Obesity  -Body mass index is 36.47 kg/m². - recommend ongoing weight loss     8. Hypothyrdoidism  - on replacement therapy   - per PCP      Plan:      1. No changes have been made to her medications   2. Remote transmission in three months  3. Follow-up in 6 months and to call the office regarding any questions or concerns    I have spent a total of 30 minutes with the patient and his/her family reviewing the above stated recommendations. A total of >50% of that time involved face-to-face time providing counseling and or coordination of care with the other providers. Thank you for allowing me to participate in your patient's care.     Татьяна Ramírez MD  Cardiac Electrophysiology  [address] [place] [address]

## 2022-09-17 ENCOUNTER — HOSPITAL ENCOUNTER (OUTPATIENT)
Age: 82
Discharge: HOME | End: 2022-09-17
Payer: MEDICARE

## 2022-09-17 LAB
ALBUMIN SERPL-MCNC: 4.2 G/DL (ref 3.5–5.2)
ALP BLD-CCNC: 111 U/L (ref 35–104)
ALT SERPL-CCNC: 33 U/L (ref 0–32)
ANION GAP SERPL CALCULATED.3IONS-SCNC: 10 MMOL/L (ref 7–16)
AST SERPL-CCNC: 27 U/L (ref 0–31)
BASOPHILS ABSOLUTE: 0.04 E9/L (ref 0–0.2)
BASOPHILS RELATIVE PERCENT: 0.6 % (ref 0–2)
BILIRUB SERPL-MCNC: 0.3 MG/DL (ref 0–1.2)
BUN BLDV-MCNC: 17 MG/DL (ref 6–23)
CALCIUM SERPL-MCNC: 9.4 MG/DL (ref 8.6–10.2)
CHLORIDE BLD-SCNC: 101 MMOL/L (ref 98–107)
CHOLESTEROL, TOTAL: 222 MG/DL (ref 0–199)
CO2: 30 MMOL/L (ref 22–29)
CREAT SERPL-MCNC: 1 MG/DL (ref 0.5–1)
CREATININE URINE: 114 MG/DL (ref 29–226)
EOSINOPHILS ABSOLUTE: 0.26 E9/L (ref 0.05–0.5)
EOSINOPHILS RELATIVE PERCENT: 3.8 % (ref 0–6)
GFR AFRICAN AMERICAN: >60
GFR NON-AFRICAN AMERICAN: 53 ML/MIN/1.73
GLUCOSE BLD-MCNC: 132 MG/DL (ref 74–99)
HBA1C MFR BLD: 6.6 % (ref 4–5.6)
HCT VFR BLD CALC: 40.3 % (ref 34–48)
HDLC SERPL-MCNC: 43 MG/DL
HEMOGLOBIN: 13.4 G/DL (ref 11.5–15.5)
IMMATURE GRANULOCYTES #: 0.02 E9/L
IMMATURE GRANULOCYTES %: 0.3 % (ref 0–5)
LDL CHOLESTEROL CALCULATED: 144 MG/DL (ref 0–99)
LYMPHOCYTES ABSOLUTE: 1.75 E9/L (ref 1.5–4)
LYMPHOCYTES RELATIVE PERCENT: 25.9 % (ref 20–42)
MCH RBC QN AUTO: 29.4 PG (ref 26–35)
MCHC RBC AUTO-ENTMCNC: 33.3 % (ref 32–34.5)
MCV RBC AUTO: 88.4 FL (ref 80–99.9)
MICROALBUMIN UR-MCNC: <12 MG/L
MICROALBUMIN/CREAT UR-RTO: ABNORMAL (ref 0–30)
MONOCYTES ABSOLUTE: 0.44 E9/L (ref 0.1–0.95)
MONOCYTES RELATIVE PERCENT: 6.5 % (ref 2–12)
NEUTROPHILS ABSOLUTE: 4.25 E9/L (ref 1.8–7.3)
NEUTROPHILS RELATIVE PERCENT: 62.9 % (ref 43–80)
PDW BLD-RTO: 13.2 FL (ref 11.5–15)
PLATELET # BLD: 158 E9/L (ref 130–450)
PMV BLD AUTO: 9.1 FL (ref 7–12)
POTASSIUM SERPL-SCNC: 4.1 MMOL/L (ref 3.5–5)
RBC # BLD: 4.56 E12/L (ref 3.5–5.5)
SODIUM BLD-SCNC: 141 MMOL/L (ref 132–146)
T4 FREE: 1.08 NG/DL (ref 0.93–1.7)
TOTAL PROTEIN: 6.8 G/DL (ref 6.4–8.3)
TRIGL SERPL-MCNC: 173 MG/DL (ref 0–149)
TSH SERPL DL<=0.05 MIU/L-ACNC: 1.61 UIU/ML (ref 0.27–4.2)
VLDLC SERPL CALC-MCNC: 35 MG/DL
WBC # BLD: 6.8 E9/L (ref 4.5–11.5)

## 2022-09-17 PROCEDURE — 82652 VIT D 1 25-DIHYDROXY: CPT

## 2022-09-17 PROCEDURE — 83036 HEMOGLOBIN GLYCOSYLATED A1C: CPT

## 2022-09-17 PROCEDURE — 85025 COMPLETE CBC W/AUTO DIFF WBC: CPT

## 2022-09-17 PROCEDURE — 84439 ASSAY OF FREE THYROXINE: CPT

## 2022-09-17 PROCEDURE — 80061 LIPID PANEL: CPT

## 2022-09-17 PROCEDURE — 82044 UR ALBUMIN SEMIQUANTITATIVE: CPT

## 2022-09-17 PROCEDURE — 80053 COMPREHEN METABOLIC PANEL: CPT

## 2022-09-17 PROCEDURE — 84443 ASSAY THYROID STIM HORMONE: CPT

## 2022-09-17 PROCEDURE — 82570 ASSAY OF URINE CREATININE: CPT

## 2022-09-17 PROCEDURE — 36415 COLL VENOUS BLD VENIPUNCTURE: CPT

## 2022-09-20 LAB — VITAMIN D 1,25-DIHYDROXY: 38.7 PG/ML (ref 19.9–79.3)

## 2022-09-30 RX ORDER — RAMIPRIL 10 MG/1
CAPSULE ORAL
Qty: 90 CAPSULE | Refills: 3 | Status: SHIPPED | OUTPATIENT
Start: 2022-09-30

## 2022-11-14 RX ORDER — ISOSORBIDE MONONITRATE 60 MG/1
TABLET, EXTENDED RELEASE ORAL
Qty: 90 TABLET | Refills: 3 | Status: SHIPPED | OUTPATIENT
Start: 2022-11-14

## 2022-11-14 RX ORDER — HYDROCHLOROTHIAZIDE 25 MG/1
TABLET ORAL
Qty: 90 TABLET | Refills: 3 | Status: SHIPPED | OUTPATIENT
Start: 2022-11-14

## 2023-01-09 RX ORDER — METOPROLOL SUCCINATE 50 MG/1
TABLET, EXTENDED RELEASE ORAL
Qty: 180 TABLET | Refills: 3 | Status: SHIPPED | OUTPATIENT
Start: 2023-01-09

## 2023-01-19 ENCOUNTER — HOSPITAL ENCOUNTER (OUTPATIENT)
Age: 83
Discharge: HOME | End: 2023-01-19
Payer: MEDICARE

## 2023-01-19 LAB
ALBUMIN SERPL-MCNC: 4.1 G/DL (ref 3.5–5.2)
ALP BLD-CCNC: 92 U/L (ref 35–104)
ALT SERPL-CCNC: 27 U/L (ref 0–32)
ANION GAP SERPL CALCULATED.3IONS-SCNC: 12 MMOL/L (ref 7–16)
AST SERPL-CCNC: 27 U/L (ref 0–31)
BASOPHILS ABSOLUTE: 0.04 E9/L (ref 0–0.2)
BASOPHILS RELATIVE PERCENT: 0.5 % (ref 0–2)
BILIRUB SERPL-MCNC: 0.6 MG/DL (ref 0–1.2)
BUN BLDV-MCNC: 19 MG/DL (ref 6–23)
C-REACTIVE PROTEIN: 0.6 MG/DL (ref 0–0.4)
CALCIUM SERPL-MCNC: 9.3 MG/DL (ref 8.6–10.2)
CHLORIDE BLD-SCNC: 97 MMOL/L (ref 98–107)
CHOLESTEROL, TOTAL: 228 MG/DL (ref 0–199)
CO2: 28 MMOL/L (ref 22–29)
CREAT SERPL-MCNC: 0.9 MG/DL (ref 0.5–1)
EOSINOPHILS ABSOLUTE: 0.33 E9/L (ref 0.05–0.5)
EOSINOPHILS RELATIVE PERCENT: 4.5 % (ref 0–6)
GFR SERPL CREATININE-BSD FRML MDRD: >60 ML/MIN/1.73
GLUCOSE BLD-MCNC: 91 MG/DL (ref 74–99)
HBA1C MFR BLD: 6.6 % (ref 4–5.6)
HCT VFR BLD CALC: 40.2 % (ref 34–48)
HDLC SERPL-MCNC: 51 MG/DL
HEMOGLOBIN: 13.8 G/DL (ref 11.5–15.5)
IMMATURE GRANULOCYTES #: 0.01 E9/L
IMMATURE GRANULOCYTES %: 0.1 % (ref 0–5)
LDL CHOLESTEROL CALCULATED: 153 MG/DL (ref 0–99)
LYMPHOCYTES ABSOLUTE: 2.03 E9/L (ref 1.5–4)
LYMPHOCYTES RELATIVE PERCENT: 27.7 % (ref 20–42)
MCH RBC QN AUTO: 29 PG (ref 26–35)
MCHC RBC AUTO-ENTMCNC: 34.3 % (ref 32–34.5)
MCV RBC AUTO: 84.5 FL (ref 80–99.9)
MONOCYTES ABSOLUTE: 0.55 E9/L (ref 0.1–0.95)
MONOCYTES RELATIVE PERCENT: 7.5 % (ref 2–12)
NEUTROPHILS ABSOLUTE: 4.36 E9/L (ref 1.8–7.3)
NEUTROPHILS RELATIVE PERCENT: 59.7 % (ref 43–80)
PDW BLD-RTO: 12.7 FL (ref 11.5–15)
PLATELET # BLD: 152 E9/L (ref 130–450)
PMV BLD AUTO: 9.2 FL (ref 7–12)
POTASSIUM SERPL-SCNC: 3.5 MMOL/L (ref 3.5–5)
RBC # BLD: 4.76 E12/L (ref 3.5–5.5)
SODIUM BLD-SCNC: 137 MMOL/L (ref 132–146)
TOTAL PROTEIN: 6.7 G/DL (ref 6.4–8.3)
TRIGL SERPL-MCNC: 118 MG/DL (ref 0–149)
TSH SERPL DL<=0.05 MIU/L-ACNC: 1.21 UIU/ML (ref 0.27–4.2)
VLDLC SERPL CALC-MCNC: 24 MG/DL
WBC # BLD: 7.3 E9/L (ref 4.5–11.5)

## 2023-01-19 PROCEDURE — 80061 LIPID PANEL: CPT

## 2023-01-19 PROCEDURE — 86140 C-REACTIVE PROTEIN: CPT

## 2023-01-19 PROCEDURE — 83036 HEMOGLOBIN GLYCOSYLATED A1C: CPT

## 2023-01-19 PROCEDURE — 84443 ASSAY THYROID STIM HORMONE: CPT

## 2023-01-19 PROCEDURE — 36415 COLL VENOUS BLD VENIPUNCTURE: CPT

## 2023-01-19 PROCEDURE — 82652 VIT D 1 25-DIHYDROXY: CPT

## 2023-01-19 PROCEDURE — 85025 COMPLETE CBC W/AUTO DIFF WBC: CPT

## 2023-01-19 PROCEDURE — 80053 COMPREHEN METABOLIC PANEL: CPT

## 2023-01-22 LAB — VITAMIN D 1,25-DIHYDROXY: 29.3 PG/ML (ref 19.9–79.3)

## 2023-03-28 ENCOUNTER — TELEPHONE (OUTPATIENT)
Dept: CARDIOLOGY CLINIC | Age: 83
End: 2023-03-28

## 2023-03-28 NOTE — TELEPHONE ENCOUNTER
Pt called to schedule routine 9mo fu around 4/21/2023. I was unsure if you had days you were holding or just schedule into June 2023. Please advise. Thank you.

## 2023-05-05 ENCOUNTER — OFFICE VISIT (OUTPATIENT)
Dept: CARDIOLOGY CLINIC | Age: 83
End: 2023-05-05
Payer: MEDICARE

## 2023-05-05 VITALS
RESPIRATION RATE: 18 BRPM | HEART RATE: 61 BPM | HEIGHT: 62 IN | WEIGHT: 192.7 LBS | BODY MASS INDEX: 35.46 KG/M2 | SYSTOLIC BLOOD PRESSURE: 128 MMHG | DIASTOLIC BLOOD PRESSURE: 60 MMHG

## 2023-05-05 DIAGNOSIS — I25.119 CORONARY ARTERY DISEASE INVOLVING NATIVE CORONARY ARTERY OF NATIVE HEART WITH ANGINA PECTORIS (HCC): Primary | Chronic | ICD-10-CM

## 2023-05-05 PROCEDURE — 99214 OFFICE O/P EST MOD 30 MIN: CPT | Performed by: INTERNAL MEDICINE

## 2023-05-05 PROCEDURE — 3074F SYST BP LT 130 MM HG: CPT | Performed by: INTERNAL MEDICINE

## 2023-05-05 PROCEDURE — 3078F DIAST BP <80 MM HG: CPT | Performed by: INTERNAL MEDICINE

## 2023-05-05 PROCEDURE — 93000 ELECTROCARDIOGRAM COMPLETE: CPT | Performed by: INTERNAL MEDICINE

## 2023-05-05 PROCEDURE — 1123F ACP DISCUSS/DSCN MKR DOCD: CPT | Performed by: INTERNAL MEDICINE

## 2023-05-05 NOTE — PROGRESS NOTES
CHIEF COMPLAINT: Chest pain/CAD-CABG/Abnormal EKG    HPI: Patient is a [de-identified] y.o.  seen at the request of Ava Jerez DO. Patient seen in follow up. Patient was seen for chest pain which resulted in a cath showing multivessel CAD. She underwent CABG 27/3/92 but had a complicated post-op course due to a wound infection. No CP or SOB.      Past Medical History:   Diagnosis Date    Arthritis     Asthma     Atrial fibrillation ([facility] 2016    Atrial flutter (HCC)     CAD (coronary artery disease)     CHB (complete heart block) (HCC)     Diabetes mellitus ([address]     Hyperlipidemia     no med    Hypertension     LBBB (left bundle branch block)     Sleep apnea     Cpap setting 10    Symptomatic bradycardia     Thyroid disease        Patient Active Problem List   Diagnosis    FLORECITA on CPAP    Hypersomnolence    Diabetes mellitus ([facility]    Essential hypertension    Sleep apnea    Coronary artery disease involving native coronary artery of native heart with angina pectoris (HCC)    Hypothyroid    Asthma    Hyperlipidemia    H/O CABG x 4 (10-8-15: Dr. Salinas Patch -> LAD, Free CHRISTINA -> D2, SVG -> OM3, SVG -> RCA)    Postoperative AV block    Postoperative atrial fibrillation (HCC)    Status post cardiac pacemaker placement (1-23-16: Dr. Minerva Carvajal)    Non morbid obesity due to excess calories    Type 1 diabetes mellitus without complication ([facility]    Surgical (sternal) wound infection (Oct. 2015)    Surgical (sternal) wound, non healing (Oct. 2015) - 11-17-15:  Excisional debridment, L pectoralis advancement flap w complex closure Deep Goldsmith MD)    Colitis    SIRS (systemic inflammatory response syndrome) (HCC)    Paroxysmal atrial fibrillation (HCC) - on apixiban    Complete heart block ([address]    Pacemaker    Pacemaker-dependent due to native cardiac rhythm insufficient to support life    Benign neoplasm of parathyroid gland    Cholelithiasis    Diastolic dysfunction    Diverticular disease    Dupuytren's disease    Gastritis

## 2023-05-09 ENCOUNTER — TELEPHONE (OUTPATIENT)
Dept: CARDIOLOGY CLINIC | Age: 83
End: 2023-05-09

## 2023-05-09 NOTE — TELEPHONE ENCOUNTER
----- Message from Mo Eldridge DO sent at 5/5/2023 10:05 AM EDT -----  Records from [facility] from April please and thank you

## 2023-05-18 ENCOUNTER — HOSPITAL ENCOUNTER (OUTPATIENT)
Age: 83
Discharge: HOME | End: 2023-05-18
Payer: MEDICARE

## 2023-05-18 LAB
ALBUMIN SERPL-MCNC: 4 G/DL (ref 3.5–5.2)
ALP SERPL-CCNC: 97 U/L (ref 35–104)
ALT SERPL-CCNC: 23 U/L (ref 0–32)
ANION GAP SERPL CALCULATED.3IONS-SCNC: 12 MMOL/L (ref 7–16)
AST SERPL-CCNC: 22 U/L (ref 0–31)
BASOPHILS # BLD: 0.01 E9/L (ref 0–0.2)
BASOPHILS NFR BLD: 0.1 % (ref 0–2)
BILIRUB SERPL-MCNC: 0.5 MG/DL (ref 0–1.2)
BUN SERPL-MCNC: 18 MG/DL (ref 6–23)
CALCIUM SERPL-MCNC: 9.5 MG/DL (ref 8.6–10.2)
CHLORIDE SERPL-SCNC: 101 MMOL/L (ref 98–107)
CHOLESTEROL, TOTAL: 176 MG/DL (ref 0–199)
CO2 SERPL-SCNC: 28 MMOL/L (ref 22–29)
CREAT SERPL-MCNC: 0.9 MG/DL (ref 0.5–1)
CREAT UR-MCNC: 75 MG/DL (ref 29–226)
EOSINOPHIL # BLD: 0.35 E9/L (ref 0.05–0.5)
EOSINOPHIL NFR BLD: 5.1 % (ref 0–6)
ERYTHROCYTE [DISTWIDTH] IN BLOOD BY AUTOMATED COUNT: 13.2 FL (ref 11.5–15)
GLUCOSE SERPL-MCNC: 102 MG/DL (ref 74–99)
HBA1C MFR BLD: 7.3 % (ref 4–5.6)
HCT VFR BLD AUTO: 41.4 % (ref 34–48)
HDLC SERPL-MCNC: 52 MG/DL
HGB BLD-MCNC: 13.4 G/DL (ref 11.5–15.5)
IMM GRANULOCYTES # BLD: 0.02 E9/L
IMM GRANULOCYTES NFR BLD: 0.3 % (ref 0–5)
LDLC SERPL CALC-MCNC: 99 MG/DL (ref 0–99)
LYMPHOCYTES # BLD: 1.94 E9/L (ref 1.5–4)
LYMPHOCYTES NFR BLD: 28.2 % (ref 20–42)
MCH RBC QN AUTO: 29.1 PG (ref 26–35)
MCHC RBC AUTO-ENTMCNC: 32.4 % (ref 32–34.5)
MCV RBC AUTO: 90 FL (ref 80–99.9)
MICROALBUMIN UR-MCNC: <12 MG/L
MICROALBUMIN/CREAT UR-RTO: ABNORMAL (ref 0–30)
MONOCYTES # BLD: 0.56 E9/L (ref 0.1–0.95)
MONOCYTES NFR BLD: 8.1 % (ref 2–12)
NEUTROPHILS # BLD: 4.01 E9/L (ref 1.8–7.3)
NEUTS SEG NFR BLD: 58.2 % (ref 43–80)
PLATELET # BLD AUTO: 162 E9/L (ref 130–450)
PMV BLD AUTO: 9.3 FL (ref 7–12)
POTASSIUM SERPL-SCNC: 3.7 MMOL/L (ref 3.5–5)
PROT SERPL-MCNC: 6.7 G/DL (ref 6.4–8.3)
RBC # BLD AUTO: 4.6 E12/L (ref 3.5–5.5)
SODIUM SERPL-SCNC: 141 MMOL/L (ref 132–146)
TRIGL SERPL-MCNC: 123 MG/DL (ref 0–149)
VLDLC SERPL CALC-MCNC: 25 MG/DL
WBC # BLD: 6.9 E9/L (ref 4.5–11.5)

## 2023-05-18 PROCEDURE — 80061 LIPID PANEL: CPT

## 2023-05-18 PROCEDURE — 83036 HEMOGLOBIN GLYCOSYLATED A1C: CPT

## 2023-05-18 PROCEDURE — 82044 UR ALBUMIN SEMIQUANTITATIVE: CPT

## 2023-05-18 PROCEDURE — 36415 COLL VENOUS BLD VENIPUNCTURE: CPT

## 2023-05-18 PROCEDURE — 82652 VIT D 1 25-DIHYDROXY: CPT

## 2023-05-18 PROCEDURE — 82570 ASSAY OF URINE CREATININE: CPT

## 2023-05-18 PROCEDURE — 80053 COMPREHEN METABOLIC PANEL: CPT

## 2023-05-18 PROCEDURE — 85025 COMPLETE CBC W/AUTO DIFF WBC: CPT

## 2023-05-20 LAB — 1,25(OH)2D3 SERPL-MCNC: 29 PG/ML (ref 19.9–79.3)

## 2023-05-30 ENCOUNTER — TELEPHONE (OUTPATIENT)
Dept: CARDIOLOGY CLINIC | Age: 83
End: 2023-05-30

## 2023-05-30 NOTE — TELEPHONE ENCOUNTER
Patient is an acceptable risk to proceed. Okay to hold eliquis for 3 days prior. Kristie Rayo D.O.   Cardiologist  Cardiology, [facility]

## 2023-05-30 NOTE — TELEPHONE ENCOUNTER
Patient needs cardiac clearance for a partial colectomy scheduled on 6/21 with , patient was seen in 5/2023 and denies any chest pain,SOB or palpitations and able to preform daily activities without any cardiac issues, please advise

## 2023-06-16 ENCOUNTER — TELEPHONE (OUTPATIENT)
Dept: NON INVASIVE DIAGNOSTICS | Age: 83
End: 2023-06-16

## 2023-06-21 ENCOUNTER — HOSPITAL ENCOUNTER (OUTPATIENT)
Age: 83
Discharge: HOME | End: 2023-06-23

## 2023-06-22 ENCOUNTER — HOSPITAL ENCOUNTER (OUTPATIENT)
Age: 83
Discharge: HOME | End: 2023-06-24

## 2023-06-22 LAB
ANION GAP SERPL CALCULATED.3IONS-SCNC: 11 MMOL/L (ref 7–16)
BASOPHILS # BLD: 0.01 E9/L (ref 0–0.2)
BASOPHILS NFR BLD: 0.1 % (ref 0–2)
BUN SERPL-MCNC: 26 MG/DL (ref 6–23)
CALCIUM SERPL-MCNC: 8.4 MG/DL (ref 8.6–10.2)
CHLORIDE SERPL-SCNC: 103 MMOL/L (ref 98–107)
CO2 SERPL-SCNC: 22 MMOL/L (ref 22–29)
CREAT SERPL-MCNC: 1.5 MG/DL (ref 0.5–1)
EOSINOPHIL # BLD: 0 E9/L (ref 0.05–0.5)
EOSINOPHIL NFR BLD: 0 % (ref 0–6)
ERYTHROCYTE [DISTWIDTH] IN BLOOD BY AUTOMATED COUNT: 12.9 FL (ref 11.5–15)
GLUCOSE SERPL-MCNC: 260 MG/DL (ref 74–99)
HCT VFR BLD AUTO: 38.8 % (ref 34–48)
HGB BLD-MCNC: 13 G/DL (ref 11.5–15.5)
IMM GRANULOCYTES # BLD: 0.05 E9/L
IMM GRANULOCYTES NFR BLD: 0.4 % (ref 0–5)
LYMPHOCYTES # BLD: 0.91 E9/L (ref 1.5–4)
LYMPHOCYTES NFR BLD: 7.8 % (ref 20–42)
MCH RBC QN AUTO: 29.7 PG (ref 26–35)
MCHC RBC AUTO-ENTMCNC: 33.5 % (ref 32–34.5)
MCV RBC AUTO: 88.6 FL (ref 80–99.9)
MONOCYTES # BLD: 0.44 E9/L (ref 0.1–0.95)
MONOCYTES NFR BLD: 3.8 % (ref 2–12)
NEUTROPHILS # BLD: 10.25 E9/L (ref 1.8–7.3)
NEUTS SEG NFR BLD: 87.9 % (ref 43–80)
PLATELET # BLD AUTO: 145 E9/L (ref 130–450)
PMV BLD AUTO: 9.9 FL (ref 7–12)
POTASSIUM SERPL-SCNC: 4.4 MMOL/L (ref 3.5–5)
RBC # BLD AUTO: 4.38 E12/L (ref 3.5–5.5)
SODIUM SERPL-SCNC: 136 MMOL/L (ref 132–146)
WBC # BLD: 11.7 E9/L (ref 4.5–11.5)

## 2023-06-22 PROCEDURE — 80048 BASIC METABOLIC PNL TOTAL CA: CPT

## 2023-06-22 PROCEDURE — 85025 COMPLETE CBC W/AUTO DIFF WBC: CPT

## 2023-06-23 ENCOUNTER — HOSPITAL ENCOUNTER (OUTPATIENT)
Age: 83
Discharge: HOME | End: 2023-06-25

## 2023-06-23 LAB
ANION GAP SERPL CALCULATED.3IONS-SCNC: 11 MMOL/L (ref 7–16)
BUN SERPL-MCNC: 27 MG/DL (ref 6–23)
CALCIUM SERPL-MCNC: 8.2 MG/DL (ref 8.6–10.2)
CHLORIDE SERPL-SCNC: 107 MMOL/L (ref 98–107)
CO2 SERPL-SCNC: 22 MMOL/L (ref 22–29)
CREAT SERPL-MCNC: 1.4 MG/DL (ref 0.5–1)
ERYTHROCYTE [DISTWIDTH] IN BLOOD BY AUTOMATED COUNT: 13.5 FL (ref 11.5–15)
GLUCOSE SERPL-MCNC: 45 MG/DL (ref 74–99)
HCT VFR BLD AUTO: 33.2 % (ref 34–48)
HGB BLD-MCNC: 11.1 G/DL (ref 11.5–15.5)
MCH RBC QN AUTO: 30.1 PG (ref 26–35)
MCHC RBC AUTO-ENTMCNC: 33.4 % (ref 32–34.5)
MCV RBC AUTO: 90 FL (ref 80–99.9)
PLATELET # BLD AUTO: 143 E9/L (ref 130–450)
PMV BLD AUTO: 9.8 FL (ref 7–12)
POTASSIUM SERPL-SCNC: 3.8 MMOL/L (ref 3.5–5)
RBC # BLD AUTO: 3.69 E12/L (ref 3.5–5.5)
SODIUM SERPL-SCNC: 140 MMOL/L (ref 132–146)
WBC # BLD: 13.3 E9/L (ref 4.5–11.5)

## 2023-06-23 PROCEDURE — 85027 COMPLETE CBC AUTOMATED: CPT

## 2023-06-23 PROCEDURE — 80048 BASIC METABOLIC PNL TOTAL CA: CPT

## 2023-06-24 ENCOUNTER — HOSPITAL ENCOUNTER (OUTPATIENT)
Age: 83
Discharge: HOME | End: 2023-06-26

## 2023-06-24 LAB
ANION GAP SERPL CALCULATED.3IONS-SCNC: 12 MMOL/L (ref 7–16)
BUN SERPL-MCNC: 20 MG/DL (ref 6–23)
CALCIUM SERPL-MCNC: 8.7 MG/DL (ref 8.6–10.2)
CHLORIDE SERPL-SCNC: 107 MMOL/L (ref 98–107)
CO2 SERPL-SCNC: 23 MMOL/L (ref 22–29)
CREAT SERPL-MCNC: 1.1 MG/DL (ref 0.5–1)
ERYTHROCYTE [DISTWIDTH] IN BLOOD BY AUTOMATED COUNT: 13.7 FL (ref 11.5–15)
GLUCOSE SERPL-MCNC: 113 MG/DL (ref 74–99)
HCT VFR BLD AUTO: 35.5 % (ref 34–48)
HGB BLD-MCNC: 11.4 G/DL (ref 11.5–15.5)
MCH RBC QN AUTO: 29.5 PG (ref 26–35)
MCHC RBC AUTO-ENTMCNC: 32.1 % (ref 32–34.5)
MCV RBC AUTO: 91.7 FL (ref 80–99.9)
PLATELET # BLD AUTO: 168 E9/L (ref 130–450)
PMV BLD AUTO: 10.1 FL (ref 7–12)
POTASSIUM SERPL-SCNC: 3.4 MMOL/L (ref 3.5–5)
RBC # BLD AUTO: 3.87 E12/L (ref 3.5–5.5)
SODIUM SERPL-SCNC: 142 MMOL/L (ref 132–146)
WBC # BLD: 13.5 E9/L (ref 4.5–11.5)

## 2023-06-24 PROCEDURE — 80048 BASIC METABOLIC PNL TOTAL CA: CPT

## 2023-06-24 PROCEDURE — 85027 COMPLETE CBC AUTOMATED: CPT

## 2023-06-25 ENCOUNTER — HOSPITAL ENCOUNTER (OUTPATIENT)
Age: 83
Discharge: HOME | End: 2023-06-27

## 2023-06-25 LAB
ANION GAP SERPL CALCULATED.3IONS-SCNC: 11 MMOL/L (ref 7–16)
BUN SERPL-MCNC: 17 MG/DL (ref 6–23)
CALCIUM SERPL-MCNC: 8.8 MG/DL (ref 8.6–10.2)
CHLORIDE SERPL-SCNC: 103 MMOL/L (ref 98–107)
CO2 SERPL-SCNC: 25 MMOL/L (ref 22–29)
CREAT SERPL-MCNC: 1 MG/DL (ref 0.5–1)
ERYTHROCYTE [DISTWIDTH] IN BLOOD BY AUTOMATED COUNT: 13.2 FL (ref 11.5–15)
GLUCOSE SERPL-MCNC: 142 MG/DL (ref 74–99)
HCT VFR BLD AUTO: 31.2 % (ref 34–48)
HGB BLD-MCNC: 10.4 G/DL (ref 11.5–15.5)
MCH RBC QN AUTO: 29.6 PG (ref 26–35)
MCHC RBC AUTO-ENTMCNC: 33.3 % (ref 32–34.5)
MCV RBC AUTO: 88.9 FL (ref 80–99.9)
PLATELET # BLD AUTO: 121 E9/L (ref 130–450)
PMV BLD AUTO: 10 FL (ref 7–12)
POTASSIUM SERPL-SCNC: 3.9 MMOL/L (ref 3.5–5)
RBC # BLD AUTO: 3.51 E12/L (ref 3.5–5.5)
SODIUM SERPL-SCNC: 139 MMOL/L (ref 132–146)
WBC # BLD: 7.8 E9/L (ref 4.5–11.5)

## 2023-06-25 PROCEDURE — 85027 COMPLETE CBC AUTOMATED: CPT

## 2023-06-25 PROCEDURE — 80048 BASIC METABOLIC PNL TOTAL CA: CPT

## 2023-07-10 NOTE — TELEPHONE ENCOUNTER
Patient : Kim Metz Age: 67 year old Sex: female   MRN: [id number] Encounter Date: 7/10/2023    History     Chief Complaint   Patient presents with   • Neurologic Problem       HPI    Kim Metz is a 67 year old presenting to the emergency department for evaluation of vision problem.  Patient herself is a poor historian, states that she is uncertain exactly why she is here.  Reportedly by EMS her sister had called because they are worried about her vision/gaze.  Uncertain when her last known well was.  When asking patient if she has double vision she states yes that she had not noticed until I pointed it out.  Uncertain when this started.  Denies any numbness, tingling, weakness.  No headache, denies any trauma falls or injuries.  Patient denies any chest pain, shortness of breath, nausea or vomiting.  She states that she does drink regularly, usually a few beers a day.  Denies any withdrawal symptoms in the past.  Uncertain exactly when her last drink was it sounds like it was just prior to arrival.      Additionally history is obtained by the sister at the bedside and states that she has been having \"vision issues for several months however she did not notice that her eye appeared cross eyed/inward until yesterday.  She states that she is also been having other issues that she states she drinks regularly, she states up to 18 beers per day.  She did notice that she has not been eating or drinking as much recently and she was concerned that something was going on.  Patient has also been \"vomiting up a lot of bile\" no reported fevers at home she did state that she was having trouble walking and seemed a little bit more confused.  Today she started having hallucinations which is abnormal for her as well.    No Known Allergies    Current Facility-Administered Medications   Medication   • nicotine (NICODERM) 21 MG/24HR patch 1 patch   • Potassium Standard Replacement Protocol (Levels 3.5 and lower)   • Magnesium  Reviewed by  on 5/9 Standard Replacement Protocol   • Phosphorus Standard Replacement Protocol   • ondansetron (ZOFRAN) injection 4 mg   • acetaminophen (TYLENOL) tablet 650 mg   • polyethylene glycol (MIRALAX) packet 17 g   • docusate sodium-sennosides (SENOKOT S) 50-8.6 MG 2 tablet   • aluminum-magnesium hydroxide-simethicone (MAALOX) 200-200-20 MG/5ML suspension 30 mL   • sodium chloride 0.9 % flush bag 25 mL   • [START ON 7/11/2023] folic acid (FOLATE) tablet 1 mg   • [START ON 7/11/2023] thiamine (VITAMIN B1) tablet 100 mg   • LORazepam (ATIVAN) tablet 2 mg    Or   • LORazepam (ATIVAN) tablet 3 mg    Or   • LORazepam (ATIVAN) tablet 4 mg    Or   • LORazepam (ATIVAN) injection 2 mg    Or   • LORazepam (ATIVAN) injection 3 mg    Or   • LORazepam (ATIVAN) injection 4 mg   • [START ON 7/11/2023] aspirin (ECOTRIN) enteric coated tablet 81 mg   • melatonin tablet 3 mg   • sodium chloride 0.9 % flush bag 25 mL   • sodium chloride (PF) 0.9 % injection 2 mL   • sodium chloride (NORMAL SALINE) 0.9 % bolus 500 mL   • heparin (porcine) injection 5,000 Units     No current outpatient medications on file.       Past Medical History:   Diagnosis Date   • Essential (primary) hypertension        History reviewed. No pertinent surgical history.    No family history on file.    Social History     Tobacco Use   • Smoking status: Never   • Smokeless tobacco: Never   Substance Use Topics   • Alcohol use: Yes     Alcohol/week: 21.0 standard drinks of alcohol     Types: 21 Cans of beer per week     Comment: 3 beers daily   • Drug use: Never       Review of Systems     Review of Systems   Constitutional: Negative.    HENT: Negative for congestion, ear pain and sinus pain.    Eyes: Positive for visual disturbance. Negative for pain and redness.   Respiratory: Negative for cough and shortness of breath.    Cardiovascular: Negative for chest pain and leg swelling.   Gastrointestinal: Negative for abdominal pain, nausea and vomiting.   Genitourinary: Negative  for dysuria and hematuria.   Skin: Negative for pallor and rash.   Neurological: Negative for dizziness and headaches.       Physical Exam     ED Triage Vitals [07/10/23 1815]   ED Triage Vitals Group      Temp 98.1 °F (36.7 °C)      Heart Rate (!) 108      Resp 14      BP (!) 141/87      SpO2 100 %      EtCO2 mmHg       Height       Weight 112 lb 7 oz (51 kg)      Weight Scale Used Scale in bed      BMI (Calculated)       IBW/kg (Calculated)        Physical Exam  Vitals reviewed.   Constitutional:       General: She is not in acute distress.     Appearance: Normal appearance. She is not ill-appearing or toxic-appearing.   HENT:      Head: Normocephalic and atraumatic.      Nose: Nose normal.      Neck: Normal range of motion and neck supple.   Eyes:      Extraocular Movements: Extraocular movements intact.      Right eye: Abnormal extraocular motion present.      Pupils: Pupils are equal, round, and reactive to light.      Comments: Unable to fully move her eye laterally on the right, cranial nerve III palsy   Cardiovascular:      Rate and Rhythm: Normal rate and regular rhythm.      Pulses: Normal pulses.   Pulmonary:      Effort: Pulmonary effort is normal. No respiratory distress.      Breath sounds: Normal breath sounds. No wheezing.   Abdominal:      General: There is no distension.      Palpations: Abdomen is soft.      Tenderness: There is no abdominal tenderness. There is no guarding.   Musculoskeletal:      Right lower leg: No edema.      Left lower leg: No edema.   Skin:     General: Skin is warm and dry.   Neurological:      General: No focal deficit present.      Mental Status: She is alert and oriented to person, place, and time.      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         Behavior: Behavior normal.           Procedures     Procedures    Lab Results     Results for orders placed or performed during the hospital encounter of 07/10/23   Comprehensive Metabolic Panel   Result Value Ref Range    Fasting Status      Sodium 136 135 - 145 mmol/L    Potassium 3.1 (L) 3.4 - 5.1 mmol/L    Chloride 94 (L) 97 - 110 mmol/L    Carbon Dioxide 22 21 - 32 mmol/L    Anion Gap 23 (H) 7 - 19 mmol/L    Glucose 91 70 - 99 mg/dL    BUN 7 6 - 20 mg/dL    Creatinine 0.36 (L) 0.51 - 0.95 mg/dL    Glomerular Filtration Rate >90 >=60    BUN/Cr 19 7 - 25    Calcium 9.7 8.4 - 10.2 mg/dL    Bilirubin, Total 1.0 0.2 - 1.0 mg/dL    GOT/AST 57 (H) <=37 Units/L    GPT/ALT 79 (H) <64 Units/L    Alkaline Phosphatase 88 45 - 117 Units/L    Albumin 4.0 3.6 - 5.1 g/dL    Protein, Total 8.2 6.4 - 8.2 g/dL    Globulin 4.2 (H) 2.0 - 4.0 g/dL    A/G Ratio 1.0 1.0 - 2.4   Alcohol   Result Value Ref Range    Alcohol None Detected None Detected mg/dL   CBC with Automated Differential (performable only)   Result Value Ref Range    WBC 6.8 4.2 - 11.0 K/mcL    RBC 4.26 4.00 - 5.20 mil/mcL    HGB 14.2 12.0 - 15.5 g/dL    HCT 40.5 36.0 - 46.5 %    MCV 95.1 78.0 - 100.0 fl    MCH 33.3 26.0 - 34.0 pg    MCHC 35.1 32.0 - 36.5 g/dL    RDW-CV 12.0 11.0 - 15.0 %    RDW-SD 41.8 39.0 - 50.0 fL     140 - 450 K/mcL    NRBC 0 <=0 /100 WBC    Neutrophil, Percent 55 %    Lymphocytes, Percent 27 %    Mono, Percent 15 %    Eosinophils, Percent 1 %    Basophils, Percent 1 %    Immature Granulocytes 1 %    Absolute Neutrophils 3.8 1.8 - 7.7 K/mcL    Absolute Lymphocytes 1.8 1.0 - 4.0 K/mcL    Absolute Monocytes  1.0 (H) 0.3 - 0.9 K/mcL    Absolute Eosinophils  0.0 0.0 - 0.5 K/mcL    Absolute Basophils 0.1 0.0 - 0.3 K/mcL    Absolute Immature Granulocytes 0.0 0.0 - 0.2 K/mcL   TROPONIN I, HIGH SENSITIVITY   Result Value Ref Range    Troponin I, High Sensitivity 19 <52 ng/L       EKG     EKG Interpretation  Sinus tachycardia, rate of 105, no ST elevations, QTc of 462    Per my review of the EKG tracing, my findings are listed above, awaiting confirmation from cardiology    Radiology Results     Imaging Results          CT HEAD WO CONTRAST (Final result)  Result time 07/10/23 20:40:43    Final result                 Impression:      No acute transcortical infarct, hemorrhage or intracranial mass effect.    Electronically Signed by: YAMILET MONROY MD   Signed on: 7/10/2023 8:40 PM   Workstation ID: [id number]             Narrative:    EXAMINATION: Computed tomography (CT) of the head without contrast    HISTORY: 67 years Female Mental status change, unknown cause     TECHNIQUE: CT of the head was performed without contrast according to  standard protocol.    COMPARISON: None    FINDINGS:     No acute intra- or extra-axial fluid collections are identified. There is  mild cerebral volume loss, without a definite lobar predilection, with  compensatory enlargement of the ventricles and sulci. The basilar cisterns  are patent. No mass effect or midline shift is seen. The gray-white matter  differentiation is normal. Periventricular white matter hypoattenuation is  favored to represent sequelae of chronic small vessel ischemic disease.  There is vascular calcification of the carotid siphons and vertebral  arteries. The visualized portions of the orbits, paranasal sinuses, and  mastoids appear normal. No acute fracture is identified.                                ED Medications     ED Medication Orders (From admission, onward)    Ordered Start     Status Ordering Provider    07/10/23 2057 07/10/23 2058  nicotine (NICODERM) 21  MG/24HR patch 1 patch  ONCE         Last MAR action: Patch Applied RADHA AUGUSTIN    07/10/23 2054 07/10/23 2055  thiamine (VITAMIN B-1) injection 100 mg  ONCE         Last MAR action: Given RADHA AUGUSTIN    07/10/23 1839 07/10/23 1839  lactated ringers bolus 1,000 mL  ONCE         Last MAR action: New Bag RADHA AUGUSTIN          ED Course     Vitals:    07/10/23 1815 07/10/23 1816 07/10/23 1822 07/10/23 1832   BP: (!) 141/87   (!) 164/98   BP Location: LUE - Left upper extremity      Patient Position: Semi-Zamarripa's      Pulse: (!) 108   (!) 103   Resp: 14   14   Temp: 98.1 °F (36.7 °C)      TempSrc: Oral      SpO2: 100% 100% 99% 100%   Weight: 51 kg (112 lb 7 oz)          ED Course as of 07/10/23 2141   Mon Jul 10, 2023   2107 I sent a message to hospitalist, Dr. Vásquez for admission [MA]      ED Course User Index  [MA] Radha Augustin, DO         RAKEL                67-year-old female presenting for vision changes.  On arrival patient is otherwise well-appearing, was initially tachycardic however that resolved on arrival, rest of her vitals are notable for some mild hypertension, she does have a history of hypertension she is not been taking any medication which is probably why she is hypertensive here.  I do lower suspicion for intracranial bleed, I did discuss that there is a possibility of stroke given her diplopia other possibilities include primary issue with the eye itself.  Patient is a daily drinker, could have some presentation show signs of Wernicke's, will give a dose of thiamine here.    Patient's labs without any leukocytosis, normal hemoglobin, mild hypokalemia patient is also mild elevation in her anion gap, negative alcohol.  Patient does states she drinks daily however is not exhibiting any withdrawal symptoms at this time, will continue to monitor closely to see if she has any significant tremors and will treat appropriately.  Patient CT head is negative, will need to stay for MRI, I placed  neurology on consult.       Disposition       Clinical Impression and Diagnosis  9:39 PM 07/10/23     Diagnosis:   1. Diplopia    2. Confusion    3. Alcohol use disorder                       Admit 7/10/2023  9:14 PM  Telemetry Bed?: Yes  Admitting Physician: LAUREN WYNN [[id number]]  Is this a telephone or verbal order?: This is a telephone order from the admitting physician       Fauzia Cosme DO  07/10/23 1612

## 2023-10-12 RX ORDER — RAMIPRIL 10 MG/1
CAPSULE ORAL
Qty: 90 CAPSULE | Refills: 3 | Status: SHIPPED | OUTPATIENT
Start: 2023-10-12

## 2023-10-16 ENCOUNTER — TELEPHONE (OUTPATIENT)
Dept: CARDIOLOGY CLINIC | Age: 83
End: 2023-10-16

## 2023-10-17 NOTE — TELEPHONE ENCOUNTER
Patient okay to proceed and okay to hold eliquis for 48 hours prior to injections if needed. Maretta Canavan, D.O.   Cardiologist  Cardiology, [address] [facility]

## 2023-10-31 NOTE — PROGRESS NOTES
order TTE today. - Follow-up with this office in 1 year. nonvalvular paroxysmal AF/AFL  - Initially diagnosed in 10/2015 following CABG. - CHADSVASC = 6 (age, sex, CAD, HTN, DM). Recommend [address] in females with score of 2 or more. DOAC preferred. - Continue apixaban 5 mg BID. While on DOAC, recommend annual monitoring of CBC and CMP.  - AF burden < 1% on PPM check today. Continue to monitor.  - Modifiable risk factors:  -- Obesity: BMI goal <27. Recommend diet and exercise. -- FLORECITA: continue CPAP compliance. -- HTN: BP goal < 130/80 mmHg. -- EtOH: continue to avoid binge/abuse. CAD sp CABG x 4 (10/2015: LIMA -> LAD, Free CHRISTINA -> D2, SVG -> OM3, SVG -> RCA)  - Management per Dr Martina Hernandez [facility]). Thank you for allowing me to participate in your patient's care. Please call me if there are any questions. Lolly Mart D.O.   Cardiac Electrophysiology  [address] [place] [address]

## 2023-11-01 ENCOUNTER — OFFICE VISIT (OUTPATIENT)
Dept: NON INVASIVE DIAGNOSTICS | Age: 83
End: 2023-11-01
Payer: MEDICARE

## 2023-11-01 VITALS
DIASTOLIC BLOOD PRESSURE: 80 MMHG | SYSTOLIC BLOOD PRESSURE: 110 MMHG | BODY MASS INDEX: 36.07 KG/M2 | HEART RATE: 61 BPM | RESPIRATION RATE: 16 BRPM | WEIGHT: 196 LBS | HEIGHT: 62 IN

## 2023-11-01 DIAGNOSIS — I44.2 COMPLETE HEART BLOCK (HCC): Primary | ICD-10-CM

## 2023-11-01 DIAGNOSIS — Z95.0 PACEMAKER: ICD-10-CM

## 2023-11-01 DIAGNOSIS — I48.0 PAROXYSMAL ATRIAL FIBRILLATION (HCC): ICD-10-CM

## 2023-11-01 PROCEDURE — 3079F DIAST BP 80-89 MM HG: CPT | Performed by: STUDENT IN AN ORGANIZED HEALTH CARE EDUCATION/TRAINING PROGRAM

## 2023-11-01 PROCEDURE — 93000 ELECTROCARDIOGRAM COMPLETE: CPT | Performed by: STUDENT IN AN ORGANIZED HEALTH CARE EDUCATION/TRAINING PROGRAM

## 2023-11-01 PROCEDURE — 99214 OFFICE O/P EST MOD 30 MIN: CPT | Performed by: STUDENT IN AN ORGANIZED HEALTH CARE EDUCATION/TRAINING PROGRAM

## 2023-11-01 PROCEDURE — 3074F SYST BP LT 130 MM HG: CPT | Performed by: STUDENT IN AN ORGANIZED HEALTH CARE EDUCATION/TRAINING PROGRAM

## 2023-11-01 PROCEDURE — 1123F ACP DISCUSS/DSCN MKR DOCD: CPT | Performed by: STUDENT IN AN ORGANIZED HEALTH CARE EDUCATION/TRAINING PROGRAM

## 2023-11-01 NOTE — PATIENT INSTRUCTIONS
No medication changes at this time. Continue remote monitoring of pacemaker every 91 days. Focus on weight loss via diet and exercise. You will be contacted to schedule echocardiogram.  Follow-up with this office in ~1 year.

## 2023-11-03 PROCEDURE — 93280 PM DEVICE PROGR EVAL DUAL: CPT | Performed by: STUDENT IN AN ORGANIZED HEALTH CARE EDUCATION/TRAINING PROGRAM

## 2023-11-10 RX ORDER — HYDROCHLOROTHIAZIDE 25 MG/1
TABLET ORAL
Qty: 90 TABLET | Refills: 3 | Status: SHIPPED | OUTPATIENT
Start: 2023-11-10

## 2023-11-18 ENCOUNTER — HOSPITAL ENCOUNTER (OUTPATIENT)
Age: 83
Discharge: HOME | End: 2023-11-18
Payer: MEDICARE

## 2023-11-18 LAB
ALBUMIN SERPL-MCNC: 4 G/DL (ref 3.5–5.2)
ALP SERPL-CCNC: 104 U/L (ref 35–104)
ALT SERPL-CCNC: 27 U/L (ref 0–32)
ANION GAP SERPL CALCULATED.3IONS-SCNC: 16 MMOL/L (ref 7–16)
AST SERPL-CCNC: 25 U/L (ref 0–31)
BASOPHILS # BLD: 0.01 K/UL (ref 0–0.2)
BASOPHILS NFR BLD: 0 % (ref 0–2)
BILIRUB SERPL-MCNC: 0.5 MG/DL (ref 0–1.2)
BUN SERPL-MCNC: 16 MG/DL (ref 6–23)
CALCIUM SERPL-MCNC: 9.5 MG/DL (ref 8.6–10.2)
CHLORIDE SERPL-SCNC: 105 MMOL/L (ref 98–107)
CHOLEST SERPL-MCNC: 225 MG/DL
CO2 SERPL-SCNC: 26 MMOL/L (ref 22–29)
CREAT SERPL-MCNC: 0.8 MG/DL (ref 0.5–1)
CREAT UR-MCNC: 90.7 MG/DL (ref 29–226)
EOSINOPHIL # BLD: 0.29 K/UL (ref 0.05–0.5)
EOSINOPHILS RELATIVE PERCENT: 4 % (ref 0–6)
ERYTHROCYTE [DISTWIDTH] IN BLOOD BY AUTOMATED COUNT: 12.8 % (ref 11.5–15)
GFR SERPL CREATININE-BSD FRML MDRD: >60 ML/MIN/1.73M2
GLUCOSE SERPL-MCNC: 100 MG/DL (ref 74–99)
HBA1C MFR BLD: 7.4 % (ref 4–5.6)
HCT VFR BLD AUTO: 40.2 % (ref 34–48)
HDLC SERPL-MCNC: 61 MG/DL
HGB BLD-MCNC: 13.8 G/DL (ref 11.5–15.5)
IMM GRANULOCYTES # BLD AUTO: <0.03 K/UL (ref 0–0.58)
IMM GRANULOCYTES NFR BLD: 0 % (ref 0–5)
LDLC SERPL CALC-MCNC: 139 MG/DL
LYMPHOCYTES NFR BLD: 1.83 K/UL (ref 1.5–4)
LYMPHOCYTES RELATIVE PERCENT: 25 % (ref 20–42)
MCH RBC QN AUTO: 30.2 PG (ref 26–35)
MCHC RBC AUTO-ENTMCNC: 34.3 G/DL (ref 32–34.5)
MCV RBC AUTO: 88 FL (ref 80–99.9)
MICROALBUMIN UR-MCNC: <12 MG/L (ref 0–19)
MICROALBUMIN/CREAT UR-RTO: NORMAL MCG/MG CREAT (ref 0–30)
MONOCYTES NFR BLD: 0.44 K/UL (ref 0.1–0.95)
MONOCYTES NFR BLD: 6 % (ref 2–12)
NEUTROPHILS NFR BLD: 65 % (ref 43–80)
NEUTS SEG NFR BLD: 4.85 K/UL (ref 1.8–7.3)
PLATELET # BLD AUTO: 163 K/UL (ref 130–450)
PMV BLD AUTO: 8.9 FL (ref 7–12)
POTASSIUM SERPL-SCNC: 3.8 MMOL/L (ref 3.5–5)
PROT SERPL-MCNC: 6.8 G/DL (ref 6.4–8.3)
RBC # BLD AUTO: 4.57 M/UL (ref 3.5–5.5)
SODIUM SERPL-SCNC: 147 MMOL/L (ref 132–146)
T4 FREE SERPL-MCNC: 1.6 NG/DL (ref 0.9–1.7)
TRIGL SERPL-MCNC: 124 MG/DL
TSH SERPL DL<=0.05 MIU/L-ACNC: 0.19 UIU/ML (ref 0.27–4.2)
VLDLC SERPL CALC-MCNC: 25 MG/DL
WBC OTHER # BLD: 7.4 K/UL (ref 4.5–11.5)

## 2023-11-18 PROCEDURE — 36415 COLL VENOUS BLD VENIPUNCTURE: CPT

## 2023-11-18 PROCEDURE — 84443 ASSAY THYROID STIM HORMONE: CPT

## 2023-11-18 PROCEDURE — 82043 UR ALBUMIN QUANTITATIVE: CPT

## 2023-11-18 PROCEDURE — 82570 ASSAY OF URINE CREATININE: CPT

## 2023-11-18 PROCEDURE — 84439 ASSAY OF FREE THYROXINE: CPT

## 2023-11-18 PROCEDURE — 85025 COMPLETE CBC W/AUTO DIFF WBC: CPT

## 2023-11-18 PROCEDURE — 80061 LIPID PANEL: CPT

## 2023-11-18 PROCEDURE — 82652 VIT D 1 25-DIHYDROXY: CPT

## 2023-11-18 PROCEDURE — 83036 HEMOGLOBIN GLYCOSYLATED A1C: CPT

## 2023-11-18 PROCEDURE — 80053 COMPREHEN METABOLIC PANEL: CPT

## 2023-11-20 LAB — 1,25(OH)2D3 SERPL-MCNC: 26.3 PG/ML (ref 19.9–79.3)

## 2023-11-30 ENCOUNTER — HOSPITAL ENCOUNTER (OUTPATIENT)
Age: 83
Discharge: HOME | End: 2023-12-02
Payer: MEDICARE

## 2023-11-30 VITALS — HEIGHT: 62 IN | BODY MASS INDEX: 36.07 KG/M2 | WEIGHT: 196 LBS

## 2023-11-30 DIAGNOSIS — I44.2 COMPLETE HEART BLOCK (HCC): ICD-10-CM

## 2023-11-30 LAB
ECHO AO ASC DIAM: 3.5 CM
ECHO AO ASCENDING AORTA INDEX: 1.84 CM/M2
ECHO BSA: 1.97 M2
ECHO EST RA PRESSURE: 3 MMHG
ECHO LA DIAMETER INDEX: 2.05 CM/M2
ECHO LA DIAMETER: 3.9 CM
ECHO LA VOL A-L A2C: 47 ML (ref 22–52)
ECHO LA VOL A-L A4C: 62 ML (ref 22–52)
ECHO LA VOL MOD A2C: 44 ML (ref 22–52)
ECHO LA VOL MOD A4C: 60 ML (ref 22–52)
ECHO LA VOLUME AREA LENGTH: 57 ML
ECHO LA VOLUME INDEX A-L A2C: 25 ML/M2 (ref 16–34)
ECHO LA VOLUME INDEX A-L A4C: 33 ML/M2 (ref 16–34)
ECHO LA VOLUME INDEX AREA LENGTH: 30 ML/M2 (ref 16–34)
ECHO LA VOLUME INDEX MOD A2C: 23 ML/M2 (ref 16–34)
ECHO LA VOLUME INDEX MOD A4C: 32 ML/M2 (ref 16–34)
ECHO LV FRACTIONAL SHORTENING: 32 % (ref 28–44)
ECHO LV INTERNAL DIMENSION DIASTOLE INDEX: 2.47 CM/M2
ECHO LV INTERNAL DIMENSION DIASTOLIC: 4.7 CM (ref 3.9–5.3)
ECHO LV INTERNAL DIMENSION SYSTOLIC INDEX: 1.68 CM/M2
ECHO LV INTERNAL DIMENSION SYSTOLIC: 3.2 CM
ECHO LV IVSD: 1.1 CM (ref 0.6–0.9)
ECHO LV IVSS: 1.6 CM
ECHO LV MASS 2D: 175.8 G (ref 67–162)
ECHO LV MASS INDEX 2D: 92.5 G/M2 (ref 43–95)
ECHO LV POSTERIOR WALL DIASTOLIC: 1 CM (ref 0.6–0.9)
ECHO LV POSTERIOR WALL SYSTOLIC: 1.5 CM
ECHO LV RELATIVE WALL THICKNESS RATIO: 0.43
ECHO RIGHT VENTRICULAR SYSTOLIC PRESSURE (RVSP): 28 MMHG
ECHO RV INTERNAL DIMENSION: 3.7 CM
ECHO RV TAPSE: 1.9 CM (ref 1.7–?)
ECHO TV REGURGITANT MAX VELOCITY: 2.48 M/S
ECHO TV REGURGITANT PEAK GRADIENT: 25 MMHG

## 2023-11-30 PROCEDURE — 93308 TTE F-UP OR LMTD: CPT

## 2023-12-06 ENCOUNTER — TELEPHONE (OUTPATIENT)
Dept: NON INVASIVE DIAGNOSTICS | Age: 83
End: 2023-12-06

## 2023-12-06 NOTE — TELEPHONE ENCOUNTER
----- Message from Jacob Sanderson DO sent at 12/2/2023 12:24 PM EST -----  Regarding: echo  Echo stable. Follow-up a previously instructed.     Jacob Sanderson DO

## 2024-02-12 ENCOUNTER — TELEPHONE (OUTPATIENT)
Dept: CARDIOLOGY CLINIC | Age: 84
End: 2024-02-12

## 2024-02-12 NOTE — TELEPHONE ENCOUNTER
Patient states that she's been having intermittent chest discomfort on exertion since ildefonso, please advise

## 2024-02-12 NOTE — TELEPHONE ENCOUNTER
Please arrange pharm stress Dx CP/MURRAY/CAD    Cyn Helm D.O.  Cardiologist  Cardiology, [facility],

## 2024-02-13 DIAGNOSIS — I25.119 CORONARY ARTERY DISEASE INVOLVING NATIVE CORONARY ARTERY OF NATIVE HEART WITH ANGINA PECTORIS (HCC): Primary | ICD-10-CM

## 2024-02-13 DIAGNOSIS — R06.02 SOB (SHORTNESS OF BREATH): ICD-10-CM

## 2024-02-13 DIAGNOSIS — R07.9 CHEST PAIN, UNSPECIFIED TYPE: ICD-10-CM

## 2024-02-13 RX ORDER — REGADENOSON 0.08 MG/ML
0.4 INJECTION, SOLUTION INTRAVENOUS
OUTPATIENT
Start: 2024-02-13

## 2024-02-13 NOTE — TELEPHONE ENCOUNTER
Goal Outcome Evaluation:  Plan of Care Reviewed With: patient           Outcome Evaluation: no changes over night, patient being treated for UTI, urine culture showing ecoli   Stress test ordered and patient notified.

## 2024-02-15 ENCOUNTER — TELEPHONE (OUTPATIENT)
Dept: CARDIOLOGY | Age: 84
End: 2024-02-15

## 2024-02-15 NOTE — TELEPHONE ENCOUNTER
Left message to schedule Lexiscan stress test.  Electronically signed by Xenia Walker on 2/15/2024 at 9:31 AM

## 2024-02-20 ENCOUNTER — TELEPHONE (OUTPATIENT)
Dept: CARDIOLOGY | Age: 84
End: 2024-02-20

## 2024-02-20 NOTE — TELEPHONE ENCOUNTER
Left message on voice mail to remind patient of Lexiscan stress test appointment on February 22, 2024 at 0830. Instructions for test,medication list, hold isosorbide and metoprolol for 24 hours before test, and COVID-19 preprocedure information left on voice mail. Asked patient to call with any questions or if unable to keep appointment.

## 2024-02-22 ENCOUNTER — HOSPITAL ENCOUNTER (OUTPATIENT)
Dept: CARDIOLOGY | Age: 84
Discharge: HOME | End: 2024-02-24
Attending: INTERNAL MEDICINE
Payer: MEDICARE

## 2024-02-22 VITALS
HEART RATE: 60 BPM | WEIGHT: 196 LBS | HEIGHT: 62 IN | RESPIRATION RATE: 18 BRPM | DIASTOLIC BLOOD PRESSURE: 78 MMHG | BODY MASS INDEX: 36.07 KG/M2 | SYSTOLIC BLOOD PRESSURE: 136 MMHG

## 2024-02-22 DIAGNOSIS — R07.9 CHEST PAIN, UNSPECIFIED TYPE: ICD-10-CM

## 2024-02-22 DIAGNOSIS — R06.02 SOB (SHORTNESS OF BREATH): ICD-10-CM

## 2024-02-22 DIAGNOSIS — I25.119 CORONARY ARTERY DISEASE INVOLVING NATIVE CORONARY ARTERY OF NATIVE HEART WITH ANGINA PECTORIS (HCC): ICD-10-CM

## 2024-02-22 LAB
ECHO BSA: 1.97 M2
NUC STRESS EJECTION FRACTION: 76 %
STRESS BASELINE DIAS BP: 78 MMHG
STRESS BASELINE HR: 70 BPM
STRESS BASELINE SYS BP: 136 MMHG
STRESS ESTIMATED WORKLOAD: 1.1 METS
STRESS O2 SAT REST: 94 %
STRESS PEAK DIAS BP: 70 MMHG
STRESS PEAK SYS BP: 132 MMHG
STRESS PERCENT HR ACHIEVED: 68 %
STRESS POST PEAK HR: 94 BPM
STRESS RATE PRESSURE PRODUCT: NORMAL BPM*MMHG
STRESS TARGET HR: 139 BPM

## 2024-02-22 PROCEDURE — 2580000003 HC RX 258: Performed by: INTERNAL MEDICINE

## 2024-02-22 PROCEDURE — 3430000000 HC RX DIAGNOSTIC RADIOPHARMACEUTICAL: Performed by: INTERNAL MEDICINE

## 2024-02-22 PROCEDURE — 6360000002 HC RX W HCPCS: Performed by: INTERNAL MEDICINE

## 2024-02-22 PROCEDURE — A9500 TC99M SESTAMIBI: HCPCS | Performed by: INTERNAL MEDICINE

## 2024-02-22 PROCEDURE — 93017 CV STRESS TEST TRACING ONLY: CPT

## 2024-02-22 RX ORDER — SODIUM CHLORIDE 0.9 % (FLUSH) 0.9 %
10 SYRINGE (ML) INJECTION PRN
Status: DISCONTINUED | OUTPATIENT
Start: 2024-02-22 | End: 2024-02-25 | Stop reason: HOSPADM

## 2024-02-22 RX ORDER — GABAPENTIN 100 MG/1
100 CAPSULE ORAL 2 TIMES DAILY
COMMUNITY
Start: 2024-02-21

## 2024-02-22 RX ORDER — TETRAKIS(2-METHOXYISOBUTYLISOCYANIDE)COPPER(I) TETRAFLUOROBORATE 1 MG/ML
35 INJECTION, POWDER, LYOPHILIZED, FOR SOLUTION INTRAVENOUS
Status: COMPLETED | OUTPATIENT
Start: 2024-02-22 | End: 2024-02-22

## 2024-02-22 RX ORDER — TRAMADOL HYDROCHLORIDE 50 MG/1
50 TABLET ORAL EVERY 6 HOURS PRN
COMMUNITY
Start: 2023-12-27

## 2024-02-22 RX ORDER — REGADENOSON 0.08 MG/ML
0.4 INJECTION, SOLUTION INTRAVENOUS
Status: COMPLETED | OUTPATIENT
Start: 2024-02-22 | End: 2024-02-22

## 2024-02-22 RX ORDER — TETRAKIS(2-METHOXYISOBUTYLISOCYANIDE)COPPER(I) TETRAFLUOROBORATE 1 MG/ML
10.4 INJECTION, POWDER, LYOPHILIZED, FOR SOLUTION INTRAVENOUS
Status: COMPLETED | OUTPATIENT
Start: 2024-02-22 | End: 2024-02-22

## 2024-02-22 RX ADMIN — SODIUM CHLORIDE, PRESERVATIVE FREE 10 ML: 5 INJECTION INTRAVENOUS at 09:26

## 2024-02-22 RX ADMIN — Medication 10.4 MILLICURIE: at 08:25

## 2024-02-22 RX ADMIN — SODIUM CHLORIDE, PRESERVATIVE FREE 10 ML: 5 INJECTION INTRAVENOUS at 09:25

## 2024-02-22 RX ADMIN — Medication 35 MILLICURIE: at 09:26

## 2024-02-22 RX ADMIN — REGADENOSON 0.4 MG: 0.08 INJECTION, SOLUTION INTRAVENOUS at 09:25

## 2024-02-22 RX ADMIN — SODIUM CHLORIDE, PRESERVATIVE FREE 10 ML: 5 INJECTION INTRAVENOUS at 08:25

## 2024-03-01 ENCOUNTER — TELEPHONE (OUTPATIENT)
Dept: CARDIOLOGY CLINIC | Age: 84
End: 2024-03-01

## 2024-03-01 NOTE — TELEPHONE ENCOUNTER
----- Message from Cyn Helm DO sent at 3/1/2024 12:56 PM EST -----  Please notify patient that their stress showed only a known scar at tip of heart, low risk overall.     Continue meds.    Cyn Helm D.O.  Cardiologist  Cardiology, [facility]

## 2024-03-11 RX ORDER — METOPROLOL SUCCINATE 50 MG/1
TABLET, EXTENDED RELEASE ORAL
Qty: 180 TABLET | Refills: 3 | Status: SHIPPED | OUTPATIENT
Start: 2024-03-11

## 2024-04-05 ENCOUNTER — HOSPITAL ENCOUNTER (OUTPATIENT)
Age: 84
Discharge: HOME | End: 2024-04-05
Payer: MEDICARE

## 2024-04-05 LAB
ALBUMIN SERPL-MCNC: 4.3 G/DL (ref 3.5–5.2)
ALP SERPL-CCNC: 110 U/L (ref 35–104)
ALT SERPL-CCNC: 33 U/L (ref 0–32)
ANION GAP SERPL CALCULATED.3IONS-SCNC: 15 MMOL/L (ref 7–16)
AST SERPL-CCNC: 27 U/L (ref 0–31)
BASOPHILS # BLD: 0.02 K/UL (ref 0–0.2)
BASOPHILS NFR BLD: 0 % (ref 0–2)
BILIRUB SERPL-MCNC: 0.8 MG/DL (ref 0–1.2)
BUN SERPL-MCNC: 24 MG/DL (ref 6–23)
CALCIUM SERPL-MCNC: 9.7 MG/DL (ref 8.6–10.2)
CHLORIDE SERPL-SCNC: 100 MMOL/L (ref 98–107)
CO2 SERPL-SCNC: 27 MMOL/L (ref 22–29)
CREAT SERPL-MCNC: 1 MG/DL (ref 0.5–1)
EOSINOPHIL # BLD: 0.38 K/UL (ref 0.05–0.5)
EOSINOPHILS RELATIVE PERCENT: 5 % (ref 0–6)
ERYTHROCYTE [DISTWIDTH] IN BLOOD BY AUTOMATED COUNT: 12.5 % (ref 11.5–15)
GFR SERPL CREATININE-BSD FRML MDRD: 55 ML/MIN/1.73M2
GLUCOSE SERPL-MCNC: 126 MG/DL (ref 74–99)
HCT VFR BLD AUTO: 42.9 % (ref 34–48)
HGB BLD-MCNC: 14.5 G/DL (ref 11.5–15.5)
IMM GRANULOCYTES # BLD AUTO: <0.03 K/UL (ref 0–0.58)
IMM GRANULOCYTES NFR BLD: 0 % (ref 0–5)
LYMPHOCYTES NFR BLD: 2.15 K/UL (ref 1.5–4)
LYMPHOCYTES RELATIVE PERCENT: 29 % (ref 20–42)
MCH RBC QN AUTO: 29.3 PG (ref 26–35)
MCHC RBC AUTO-ENTMCNC: 33.8 G/DL (ref 32–34.5)
MCV RBC AUTO: 86.7 FL (ref 80–99.9)
MONOCYTES NFR BLD: 0.57 K/UL (ref 0.1–0.95)
MONOCYTES NFR BLD: 8 % (ref 2–12)
NEUTROPHILS NFR BLD: 58 % (ref 43–80)
NEUTS SEG NFR BLD: 4.32 K/UL (ref 1.8–7.3)
PLATELET # BLD AUTO: 147 K/UL (ref 130–450)
PMV BLD AUTO: 9.2 FL (ref 7–12)
POTASSIUM SERPL-SCNC: 3.9 MMOL/L (ref 3.5–5)
PROT SERPL-MCNC: 7 G/DL (ref 6.4–8.3)
RBC # BLD AUTO: 4.95 M/UL (ref 3.5–5.5)
SODIUM SERPL-SCNC: 142 MMOL/L (ref 132–146)
WBC OTHER # BLD: 7.5 K/UL (ref 4.5–11.5)

## 2024-04-05 PROCEDURE — 85025 COMPLETE CBC W/AUTO DIFF WBC: CPT

## 2024-04-05 PROCEDURE — 80053 COMPREHEN METABOLIC PANEL: CPT

## 2024-04-05 PROCEDURE — 36415 COLL VENOUS BLD VENIPUNCTURE: CPT

## 2024-05-21 ENCOUNTER — HOSPITAL ENCOUNTER (OUTPATIENT)
Age: 84
Discharge: HOME | End: 2024-05-21
Payer: MEDICARE

## 2024-05-21 LAB
ALBUMIN SERPL-MCNC: 4.4 G/DL (ref 3.5–5.2)
ALP SERPL-CCNC: 99 U/L (ref 35–104)
ALT SERPL-CCNC: 24 U/L (ref 0–32)
ANION GAP SERPL CALCULATED.3IONS-SCNC: 13 MMOL/L (ref 7–16)
AST SERPL-CCNC: 24 U/L (ref 0–31)
BASOPHILS # BLD: 0.02 K/UL (ref 0–0.2)
BASOPHILS NFR BLD: 0 % (ref 0–2)
BILIRUB SERPL-MCNC: 0.5 MG/DL (ref 0–1.2)
BUN SERPL-MCNC: 24 MG/DL (ref 6–23)
CALCIUM SERPL-MCNC: 9.7 MG/DL (ref 8.6–10.2)
CHLORIDE SERPL-SCNC: 101 MMOL/L (ref 98–107)
CHOLEST SERPL-MCNC: 234 MG/DL
CO2 SERPL-SCNC: 29 MMOL/L (ref 22–29)
CREAT SERPL-MCNC: 1 MG/DL (ref 0.5–1)
CREAT UR-MCNC: 77.2 MG/DL (ref 29–226)
EOSINOPHIL # BLD: 0.3 K/UL (ref 0.05–0.5)
EOSINOPHILS RELATIVE PERCENT: 4 % (ref 0–6)
ERYTHROCYTE [DISTWIDTH] IN BLOOD BY AUTOMATED COUNT: 12.8 % (ref 11.5–15)
GFR, ESTIMATED: 59 ML/MIN/1.73M2
GLUCOSE SERPL-MCNC: 102 MG/DL (ref 74–99)
HBA1C MFR BLD: 7.3 % (ref 4–5.6)
HCT VFR BLD AUTO: 44 % (ref 34–48)
HDLC SERPL-MCNC: 53 MG/DL
HGB BLD-MCNC: 14.8 G/DL (ref 11.5–15.5)
IMM GRANULOCYTES # BLD AUTO: <0.03 K/UL (ref 0–0.58)
IMM GRANULOCYTES NFR BLD: 0 % (ref 0–5)
LDLC SERPL CALC-MCNC: 152 MG/DL
LYMPHOCYTES NFR BLD: 2.08 K/UL (ref 1.5–4)
LYMPHOCYTES RELATIVE PERCENT: 30 % (ref 20–42)
MCH RBC QN AUTO: 30 PG (ref 26–35)
MCHC RBC AUTO-ENTMCNC: 33.6 G/DL (ref 32–34.5)
MCV RBC AUTO: 89.1 FL (ref 80–99.9)
MICROALBUMIN UR-MCNC: <12 MG/L (ref 0–19)
MICROALBUMIN/CREAT UR-RTO: NORMAL MCG/MG CREAT (ref 0–30)
MONOCYTES NFR BLD: 0.5 K/UL (ref 0.1–0.95)
MONOCYTES NFR BLD: 7 % (ref 2–12)
NEUTROPHILS NFR BLD: 58 % (ref 43–80)
NEUTS SEG NFR BLD: 3.97 K/UL (ref 1.8–7.3)
PLATELET # BLD AUTO: 155 K/UL (ref 130–450)
PMV BLD AUTO: 9.1 FL (ref 7–12)
POTASSIUM SERPL-SCNC: 3.9 MMOL/L (ref 3.5–5)
PROT SERPL-MCNC: 7.2 G/DL (ref 6.4–8.3)
RBC # BLD AUTO: 4.94 M/UL (ref 3.5–5.5)
SODIUM SERPL-SCNC: 143 MMOL/L (ref 132–146)
T4 FREE SERPL-MCNC: 1 NG/DL (ref 0.9–1.7)
TRIGL SERPL-MCNC: 143 MG/DL
TSH SERPL DL<=0.05 MIU/L-ACNC: 1.41 UIU/ML (ref 0.27–4.2)
VLDLC SERPL CALC-MCNC: 29 MG/DL
WBC OTHER # BLD: 6.9 K/UL (ref 4.5–11.5)

## 2024-05-21 PROCEDURE — 84439 ASSAY OF FREE THYROXINE: CPT

## 2024-05-21 PROCEDURE — 84443 ASSAY THYROID STIM HORMONE: CPT

## 2024-05-21 PROCEDURE — 82043 UR ALBUMIN QUANTITATIVE: CPT

## 2024-05-21 PROCEDURE — 80061 LIPID PANEL: CPT

## 2024-05-21 PROCEDURE — 82570 ASSAY OF URINE CREATININE: CPT

## 2024-05-21 PROCEDURE — 36415 COLL VENOUS BLD VENIPUNCTURE: CPT

## 2024-05-21 PROCEDURE — 83036 HEMOGLOBIN GLYCOSYLATED A1C: CPT

## 2024-05-21 PROCEDURE — 80053 COMPREHEN METABOLIC PANEL: CPT

## 2024-05-21 PROCEDURE — 82652 VIT D 1 25-DIHYDROXY: CPT

## 2024-05-21 PROCEDURE — 85025 COMPLETE CBC W/AUTO DIFF WBC: CPT

## 2024-05-22 ENCOUNTER — OFFICE VISIT (OUTPATIENT)
Dept: CARDIOLOGY CLINIC | Age: 84
End: 2024-05-22
Payer: MEDICARE

## 2024-05-22 VITALS
SYSTOLIC BLOOD PRESSURE: 138 MMHG | WEIGHT: 195.8 LBS | RESPIRATION RATE: 16 BRPM | HEIGHT: 62 IN | DIASTOLIC BLOOD PRESSURE: 76 MMHG | HEART RATE: 66 BPM | BODY MASS INDEX: 36.03 KG/M2

## 2024-05-22 DIAGNOSIS — R07.9 CHEST PAIN, UNSPECIFIED TYPE: Primary | ICD-10-CM

## 2024-05-22 PROCEDURE — 99214 OFFICE O/P EST MOD 30 MIN: CPT | Performed by: INTERNAL MEDICINE

## 2024-05-22 PROCEDURE — 3075F SYST BP GE 130 - 139MM HG: CPT | Performed by: INTERNAL MEDICINE

## 2024-05-22 PROCEDURE — 1123F ACP DISCUSS/DSCN MKR DOCD: CPT | Performed by: INTERNAL MEDICINE

## 2024-05-22 PROCEDURE — 3078F DIAST BP <80 MM HG: CPT | Performed by: INTERNAL MEDICINE

## 2024-05-22 PROCEDURE — 93000 ELECTROCARDIOGRAM COMPLETE: CPT | Performed by: INTERNAL MEDICINE

## 2024-05-22 NOTE — PROGRESS NOTES
Normal left ventricle size and systolic function.   Stage I diastolic dysfunction.   Mild mitral regurgitation.    Stress Summary 2/22/2024:    Stress ECG: The stress ECG was non-diagnostic due to paced rhythm.    Stress Function: Normal left ventricle size. Left ventricular function is normal. Ejection fraction is 76%.    Perfusion Comments: Moderate-sized fixed perfusion defect in the apex. No reversible perfusion defect. Soft tissue attenuation present (SRS 15, SSS 4).     ASSESSMENT AND PLAN:  Patient Active Problem List   Diagnosis    FLORECITA on CPAP    Hypersomnolence    Diabetes mellitus (HCC)    Essential hypertension    Sleep apnea    Coronary artery disease involving native coronary artery of native heart with angina pectoris (HCC)    Hypothyroid    Asthma    Hyperlipidemia    H/O CABG x 4 (10-8-15: Dr. James)(RUCKER -> LAD, Free CHRISTINA -> D2, SVG -> OM3, SVG -> RCA)    Postoperative AV block    Postoperative atrial fibrillation (HCC)    Status post cardiac pacemaker placement (1-23-16: Dr. Hillman)    Non morbid obesity due to excess calories    Type 1 diabetes mellitus without complication ([facility])    Surgical (sternal) wound infection (Oct. 2015)    Surgical (sternal) wound, non healing (Oct. 2015) - 11-17-15:  Excisional debridment, L pectoralis advancement flap w complex closure (Michael Magana MD)    Colitis    SIRS (systemic inflammatory response syndrome) (HCC)    Paroxysmal atrial fibrillation (HCC) - on apixiban    Complete heart block (HCC)    Pacemaker    Pacemaker-dependent due to native cardiac rhythm insufficient to support life    Benign neoplasm of parathyroid gland    Cholelithiasis    Diastolic dysfunction    Diverticular disease    Dupuytren's disease    Gastritis    Glaucoma    Heart failure with preserved ejection fraction (HCC)    Intraocular pressure finding    Obesity with body mass index 30 or greater    Osteoarthritis    Polyp of colon    Spondylosis of lumbar spine    Triggering of digit

## 2024-05-24 LAB — 1,25(OH)2D3 SERPL-MCNC: 22 PG/ML (ref 19.9–79.3)

## 2024-09-20 RX ORDER — RAMIPRIL 10 MG/1
CAPSULE ORAL
Qty: 90 CAPSULE | Refills: 3 | Status: SHIPPED | OUTPATIENT
Start: 2024-09-20

## 2024-10-29 RX ORDER — NITROGLYCERIN 0.4 MG/1
0.4 TABLET SUBLINGUAL EVERY 5 MIN PRN
Qty: 25 TABLET | Refills: 2 | Status: SHIPPED | OUTPATIENT
Start: 2024-10-29

## 2024-10-29 RX ORDER — NITROGLYCERIN 0.4 MG/1
TABLET SUBLINGUAL
Qty: 25 TABLET | Refills: 1 | Status: SHIPPED
Start: 2024-10-29 | End: 2024-10-29 | Stop reason: SDUPTHER

## 2024-11-01 ENCOUNTER — OFFICE VISIT (OUTPATIENT)
Dept: NON INVASIVE DIAGNOSTICS | Age: 84
End: 2024-11-01

## 2024-11-01 VITALS
DIASTOLIC BLOOD PRESSURE: 80 MMHG | WEIGHT: 192 LBS | SYSTOLIC BLOOD PRESSURE: 122 MMHG | HEART RATE: 60 BPM | BODY MASS INDEX: 35.33 KG/M2 | RESPIRATION RATE: 16 BRPM | HEIGHT: 62 IN

## 2024-11-01 DIAGNOSIS — I44.2 COMPLETE HEART BLOCK (HCC): ICD-10-CM

## 2024-11-01 DIAGNOSIS — I48.0 PAROXYSMAL ATRIAL FIBRILLATION (HCC): Primary | ICD-10-CM

## 2024-11-01 DIAGNOSIS — Z79.01 ANTICOAGULATED BY ANTICOAGULATION TREATMENT: ICD-10-CM

## 2024-11-01 DIAGNOSIS — Z95.0 PACEMAKER: ICD-10-CM

## 2024-11-01 NOTE — PROGRESS NOTES
[facility] CARDIOLOGY  CARDIAC ELECTROPHYSIOLOGY DEPARTMENT/DIVISION OF CARDIOLOGY  Outpatient Progress Report  PATIENT: Denise Lowery  MEDICAL RECORD NUMBER: [id number]  DATE OF SERVICE:  11/1/2024  ELECTROPHYSIOLOGIST:  Marquez Nieto D.O.  PCP:  Ava Jerez DO  CHIEF COMPLAINT: PPM insitu    HPI: Denise Lowery is a 83 y.o. female with a history of 3* AV block sp BSCI dc PPM (DOI: 1/23/17- Dr Hillman), nonvalvular paroxysmal AF/AFL, CAD sp CABG x 4 (10/2015: LIMA -> LAD, Free CHRISTINA -> D2, SVG -> OM3, SVG -> RCA), sternal wound infection sp flap, HTN, obesity-class II, FLORECITA on CPAP, DM, OA, and hypothyroidism. In 2015, she was diagnosed with CAD, which was treated with CABG x 4. This procedure was complicated by sternal wound infection, which required skin flap repair. Additionally, she was diagnosed with AF/AFL, which was treated with OAC. In 2017, she was diagnosed with 3* AV block, which was treated with BSCI dc PPM by Dr Hillman.   She is here today for follow up in the office. She follows remotely.     She has not had remote set up and working in over a year. Device check today in the office stable.   She has not had Af in over  years.   SOBOE with steps. Goes away with quick rest. Does not go up steps often. Active, but does not exercise regularly.     Prior cardiac testing:   TTE: 11/30/2023  Pharmacologic Nuclear Stress (2/10/21): LVEF = 73%, partially reversible defect in distal anterior, anteroseptal and apical walls.  TTE (4/8/21): LVEF = 60%, stage I LVDD, mild MR.  Pharm Nuc Stress (4/2/19): LVEF = 77%, apical anterior infarct.  LHC (10/5/15): LVEF = 55-60%, 75-80% mid LAD stenosis, 50% mid D1 stenosis, 70% ostial D1 branch stenosis, 50% proximal and 95% mid Lcx stenosis, subtotal occlusion of proximal RCA with distal filling via left->right collaterals    Past Medical History:   Diagnosis Date    Arthritis     Asthma     Atrial fibrillation (HCC) 2016    Atrial flutter (HCC)

## 2024-11-01 NOTE — PATIENT INSTRUCTIONS
Will plan to send you a transmission box for [employer] remote monitoring.   Once hooked back up, we can consider stopping Eliquis and monitoring for recurrence of afib.

## 2024-11-21 ENCOUNTER — HOSPITAL ENCOUNTER (OUTPATIENT)
Age: 84
Discharge: HOME | End: 2024-11-21
Payer: MEDICARE

## 2024-11-21 LAB
ALBUMIN SERPL-MCNC: 4.1 G/DL (ref 3.5–5.2)
ALP SERPL-CCNC: 116 U/L (ref 35–104)
ALT SERPL-CCNC: 35 U/L (ref 0–32)
ANION GAP SERPL CALCULATED.3IONS-SCNC: 7 MMOL/L (ref 7–16)
AST SERPL-CCNC: 31 U/L (ref 0–31)
BASOPHILS # BLD: 0.05 K/UL (ref 0–0.2)
BASOPHILS NFR BLD: 1 % (ref 0–2)
BILIRUB SERPL-MCNC: 0.4 MG/DL (ref 0–1.2)
BUN SERPL-MCNC: 19 MG/DL (ref 6–23)
CALCIUM SERPL-MCNC: 9.6 MG/DL (ref 8.6–10.2)
CHLORIDE SERPL-SCNC: 100 MMOL/L (ref 98–107)
CHOLEST SERPL-MCNC: 203 MG/DL
CO2 SERPL-SCNC: 35 MMOL/L (ref 22–29)
CREAT SERPL-MCNC: 1 MG/DL (ref 0.5–1)
CREAT UR-MCNC: 69.5 MG/DL (ref 29–226)
EOSINOPHIL # BLD: 0.21 K/UL (ref 0.05–0.5)
EOSINOPHILS RELATIVE PERCENT: 3 % (ref 0–6)
ERYTHROCYTE [DISTWIDTH] IN BLOOD BY AUTOMATED COUNT: 12.3 % (ref 11.5–15)
GFR, ESTIMATED: 55 ML/MIN/1.73M2
GLUCOSE SERPL-MCNC: 81 MG/DL (ref 74–99)
HBA1C MFR BLD: 7.6 % (ref 4–5.6)
HCT VFR BLD AUTO: 43.4 % (ref 34–48)
HDLC SERPL-MCNC: 54 MG/DL
HGB BLD-MCNC: 14.4 G/DL (ref 11.5–15.5)
IMM GRANULOCYTES # BLD AUTO: 0.03 K/UL (ref 0–0.58)
IMM GRANULOCYTES NFR BLD: 0 % (ref 0–5)
LDLC SERPL CALC-MCNC: 125 MG/DL
LYMPHOCYTES NFR BLD: 1.65 K/UL (ref 1.5–4)
LYMPHOCYTES RELATIVE PERCENT: 23 % (ref 20–42)
MCH RBC QN AUTO: 29.3 PG (ref 26–35)
MCHC RBC AUTO-ENTMCNC: 33.2 G/DL (ref 32–34.5)
MCV RBC AUTO: 88.2 FL (ref 80–99.9)
MICROALBUMIN UR-MCNC: <12 MG/L (ref 0–19)
MICROALBUMIN/CREAT UR-RTO: NORMAL MCG/MG CREAT (ref 0–30)
MONOCYTES NFR BLD: 0.41 K/UL (ref 0.1–0.95)
MONOCYTES NFR BLD: 6 % (ref 2–12)
NEUTROPHILS NFR BLD: 67 % (ref 43–80)
NEUTS SEG NFR BLD: 4.78 K/UL (ref 1.8–7.3)
PLATELET # BLD AUTO: 176 K/UL (ref 130–450)
PMV BLD AUTO: 9.2 FL (ref 7–12)
POTASSIUM SERPL-SCNC: 3.5 MMOL/L (ref 3.5–5)
PROT SERPL-MCNC: 6.8 G/DL (ref 6.4–8.3)
RBC # BLD AUTO: 4.92 M/UL (ref 3.5–5.5)
SODIUM SERPL-SCNC: 142 MMOL/L (ref 132–146)
T4 FREE SERPL-MCNC: 1.1 NG/DL (ref 0.9–1.7)
TRIGL SERPL-MCNC: 120 MG/DL
TSH SERPL DL<=0.05 MIU/L-ACNC: 0.81 UIU/ML (ref 0.27–4.2)
VLDLC SERPL CALC-MCNC: 24 MG/DL
WBC OTHER # BLD: 7.1 K/UL (ref 4.5–11.5)

## 2024-11-21 PROCEDURE — 82043 UR ALBUMIN QUANTITATIVE: CPT

## 2024-11-21 PROCEDURE — 85025 COMPLETE CBC W/AUTO DIFF WBC: CPT

## 2024-11-21 PROCEDURE — 80061 LIPID PANEL: CPT

## 2024-11-21 PROCEDURE — 36415 COLL VENOUS BLD VENIPUNCTURE: CPT

## 2024-11-21 PROCEDURE — 83036 HEMOGLOBIN GLYCOSYLATED A1C: CPT

## 2024-11-21 PROCEDURE — 84439 ASSAY OF FREE THYROXINE: CPT

## 2024-11-21 PROCEDURE — 84443 ASSAY THYROID STIM HORMONE: CPT

## 2024-11-21 PROCEDURE — 82570 ASSAY OF URINE CREATININE: CPT

## 2024-11-21 PROCEDURE — 80053 COMPREHEN METABOLIC PANEL: CPT

## 2024-11-21 PROCEDURE — 82652 VIT D 1 25-DIHYDROXY: CPT

## 2024-11-24 LAB — 1,25(OH)2D3 SERPL-MCNC: 22.1 PG/ML (ref 19.9–79.3)

## 2024-11-26 PROCEDURE — 93294 REM INTERROG EVL PM/LDLS PM: CPT | Performed by: STUDENT IN AN ORGANIZED HEALTH CARE EDUCATION/TRAINING PROGRAM

## 2024-11-26 PROCEDURE — 93296 REM INTERROG EVL PM/IDS: CPT | Performed by: STUDENT IN AN ORGANIZED HEALTH CARE EDUCATION/TRAINING PROGRAM

## 2024-12-30 ENCOUNTER — OFFICE VISIT (OUTPATIENT)
Dept: CARDIOLOGY CLINIC | Age: 84
End: 2024-12-30
Payer: MEDICARE

## 2024-12-30 VITALS
OXYGEN SATURATION: 93 % | BODY MASS INDEX: 36.27 KG/M2 | HEIGHT: 62 IN | SYSTOLIC BLOOD PRESSURE: 112 MMHG | WEIGHT: 197.1 LBS | DIASTOLIC BLOOD PRESSURE: 58 MMHG | TEMPERATURE: 97.5 F | HEART RATE: 60 BPM | RESPIRATION RATE: 16 BRPM

## 2024-12-30 DIAGNOSIS — R07.9 CHEST PAIN, UNSPECIFIED TYPE: Primary | ICD-10-CM

## 2024-12-30 PROCEDURE — 3078F DIAST BP <80 MM HG: CPT | Performed by: INTERNAL MEDICINE

## 2024-12-30 PROCEDURE — 93000 ELECTROCARDIOGRAM COMPLETE: CPT | Performed by: INTERNAL MEDICINE

## 2024-12-30 PROCEDURE — 1123F ACP DISCUSS/DSCN MKR DOCD: CPT | Performed by: INTERNAL MEDICINE

## 2024-12-30 PROCEDURE — 99214 OFFICE O/P EST MOD 30 MIN: CPT | Performed by: INTERNAL MEDICINE

## 2024-12-30 PROCEDURE — 3074F SYST BP LT 130 MM HG: CPT | Performed by: INTERNAL MEDICINE

## 2024-12-30 PROCEDURE — 1159F MED LIST DOCD IN RCRD: CPT | Performed by: INTERNAL MEDICINE

## 2024-12-30 NOTE — PROGRESS NOTES
CHIEF COMPLAINT: Chest pain/CAD-CABG/Abnormal EKG    HPI: Patient is a 84 y.o.  seen at the request of Ava Jerez DO.      Patient seen in follow up. Patient was seen for chest pain which resulted in a cath showing multivessel CAD. She underwent CABG 10/8/15 but had a complicated post-op course due to a wound infection.     No SOB. Stable angina.     Past Medical History:   Diagnosis Date    Arthritis     Asthma     Atrial fibrillation (HCC) 2016    Atrial flutter (HCC)     CAD (coronary artery disease)     CHB (complete heart block) (HCC)     Diabetes mellitus (HCC)     Hyperlipidemia     no med    Hypertension     LBBB (left bundle branch block)     Sleep apnea     Cpap setting 10    Symptomatic bradycardia     Thyroid disease        Patient Active Problem List   Diagnosis    FLORECITA on CPAP    Hypersomnolence    Diabetes mellitus (HCC)    Essential hypertension    Sleep apnea    Coronary artery disease involving native coronary artery of native heart with angina pectoris (HCC)    Hypothyroid    Asthma    Hyperlipidemia    H/O CABG x 4 (10-8-15: Dr. James)(RUCKER -> LAD, Free CHRISTINA -> D2, SVG -> OM3, SVG -> RCA)    Postoperative AV block    Postoperative atrial fibrillation (HCC)    Status post cardiac pacemaker placement (1-23-16: Dr. Hillman)    Non morbid obesity due to excess calories    Type 1 diabetes mellitus without complication (HCC)    Surgical (sternal) wound infection (Oct. 2015)    Surgical (sternal) wound, non healing (Oct. 2015) - 11-17-15:  Excisional debridment, L pectoralis advancement flap w complex closure (Michael Magana MD)    Colitis    SIRS (systemic inflammatory response syndrome) (HCC)    Paroxysmal atrial fibrillation (HCC) - on apixiban    Complete heart block (HCC)    Pacemaker    Pacemaker-dependent due to native cardiac rhythm insufficient to support life    Benign neoplasm of parathyroid gland    Cholelithiasis    Diastolic dysfunction    Diverticular disease    Dupuytren's disease

## 2025-01-13 RX ORDER — HYDROCHLOROTHIAZIDE 25 MG/1
TABLET ORAL
Qty: 90 TABLET | Refills: 3 | Status: SHIPPED | OUTPATIENT
Start: 2025-01-13

## 2025-01-15 RX ORDER — METOPROLOL SUCCINATE 50 MG/1
TABLET, EXTENDED RELEASE ORAL
Qty: 180 TABLET | Refills: 3 | Status: SHIPPED | OUTPATIENT
Start: 2025-01-15

## 2025-01-27 RX ORDER — ISOSORBIDE MONONITRATE 60 MG/1
TABLET, EXTENDED RELEASE ORAL
Qty: 90 TABLET | Refills: 3 | Status: SHIPPED | OUTPATIENT
Start: 2025-01-27

## 2025-02-21 ENCOUNTER — HOSPITAL ENCOUNTER (OUTPATIENT)
Age: 85
Discharge: HOME | End: 2025-02-21
Payer: MEDICARE

## 2025-02-21 LAB
ALBUMIN SERPL-MCNC: 3.9 G/DL (ref 3.5–5.2)
ALP SERPL-CCNC: 95 U/L (ref 35–104)
ALT SERPL-CCNC: 23 U/L (ref 0–32)
ANION GAP SERPL CALCULATED.3IONS-SCNC: 13 MMOL/L (ref 7–16)
AST SERPL-CCNC: 22 U/L (ref 0–31)
BASOPHILS # BLD: 0.04 K/UL (ref 0–0.2)
BASOPHILS NFR BLD: 1 % (ref 0–2)
BILIRUB SERPL-MCNC: 0.7 MG/DL (ref 0–1.2)
BUN SERPL-MCNC: 24 MG/DL (ref 6–23)
CALCIUM SERPL-MCNC: 9.1 MG/DL (ref 8.6–10.2)
CHLORIDE SERPL-SCNC: 101 MMOL/L (ref 98–107)
CHOLEST SERPL-MCNC: 201 MG/DL
CO2 SERPL-SCNC: 28 MMOL/L (ref 22–29)
CREAT SERPL-MCNC: 1.1 MG/DL (ref 0.5–1)
CREAT UR-MCNC: 114.4 MG/DL (ref 29–226)
EOSINOPHIL # BLD: 0.31 K/UL (ref 0.05–0.5)
EOSINOPHILS RELATIVE PERCENT: 5 % (ref 0–6)
ERYTHROCYTE [DISTWIDTH] IN BLOOD BY AUTOMATED COUNT: 12.8 % (ref 11.5–15)
GFR, ESTIMATED: 52 ML/MIN/1.73M2
GLUCOSE SERPL-MCNC: 74 MG/DL (ref 74–99)
HBA1C MFR BLD: 7.1 % (ref 4–5.6)
HCT VFR BLD AUTO: 42.4 % (ref 34–48)
HDLC SERPL-MCNC: 56 MG/DL
HGB BLD-MCNC: 14.1 G/DL (ref 11.5–15.5)
IMM GRANULOCYTES # BLD AUTO: <0.03 K/UL (ref 0–0.58)
IMM GRANULOCYTES NFR BLD: 0 % (ref 0–5)
LDLC SERPL CALC-MCNC: 124 MG/DL
LYMPHOCYTES NFR BLD: 1.75 K/UL (ref 1.5–4)
LYMPHOCYTES RELATIVE PERCENT: 29 % (ref 20–42)
MCH RBC QN AUTO: 29.8 PG (ref 26–35)
MCHC RBC AUTO-ENTMCNC: 33.3 G/DL (ref 32–34.5)
MCV RBC AUTO: 89.6 FL (ref 80–99.9)
MICROALBUMIN UR-MCNC: <12 MG/L (ref 0–19)
MICROALBUMIN/CREAT UR-RTO: NORMAL MCG/MG CREAT (ref 0–30)
MONOCYTES NFR BLD: 0.47 K/UL (ref 0.1–0.95)
MONOCYTES NFR BLD: 8 % (ref 2–12)
NEUTROPHILS NFR BLD: 58 % (ref 43–80)
NEUTS SEG NFR BLD: 3.52 K/UL (ref 1.8–7.3)
PLATELET # BLD AUTO: 159 K/UL (ref 130–450)
PMV BLD AUTO: 9.8 FL (ref 7–12)
POTASSIUM SERPL-SCNC: 3.5 MMOL/L (ref 3.5–5)
PROT SERPL-MCNC: 6.5 G/DL (ref 6.4–8.3)
RBC # BLD AUTO: 4.73 M/UL (ref 3.5–5.5)
SODIUM SERPL-SCNC: 142 MMOL/L (ref 132–146)
T4 FREE SERPL-MCNC: 1.1 NG/DL (ref 0.9–1.7)
TRIGL SERPL-MCNC: 107 MG/DL
TSH SERPL DL<=0.05 MIU/L-ACNC: 2.39 UIU/ML (ref 0.27–4.2)
VLDLC SERPL CALC-MCNC: 21 MG/DL
WBC OTHER # BLD: 6.1 K/UL (ref 4.5–11.5)

## 2025-02-21 PROCEDURE — 82570 ASSAY OF URINE CREATININE: CPT

## 2025-02-21 PROCEDURE — 80053 COMPREHEN METABOLIC PANEL: CPT

## 2025-02-21 PROCEDURE — 80061 LIPID PANEL: CPT

## 2025-02-21 PROCEDURE — 84443 ASSAY THYROID STIM HORMONE: CPT

## 2025-02-21 PROCEDURE — 83036 HEMOGLOBIN GLYCOSYLATED A1C: CPT

## 2025-02-21 PROCEDURE — 85025 COMPLETE CBC W/AUTO DIFF WBC: CPT

## 2025-02-21 PROCEDURE — 36415 COLL VENOUS BLD VENIPUNCTURE: CPT

## 2025-02-21 PROCEDURE — 82043 UR ALBUMIN QUANTITATIVE: CPT

## 2025-02-21 PROCEDURE — 82652 VIT D 1 25-DIHYDROXY: CPT

## 2025-02-21 PROCEDURE — 84439 ASSAY OF FREE THYROXINE: CPT

## 2025-02-23 LAB — 1,25(OH)2D3 SERPL-MCNC: 29.4 PG/ML (ref 19.9–79.3)

## 2025-06-14 PROCEDURE — 93294 REM INTERROG EVL PM/LDLS PM: CPT | Performed by: STUDENT IN AN ORGANIZED HEALTH CARE EDUCATION/TRAINING PROGRAM

## 2025-06-14 PROCEDURE — 93296 REM INTERROG EVL PM/IDS: CPT | Performed by: STUDENT IN AN ORGANIZED HEALTH CARE EDUCATION/TRAINING PROGRAM

## 2025-06-20 ENCOUNTER — HOSPITAL ENCOUNTER (OUTPATIENT)
Age: 85
Discharge: HOME | End: 2025-06-20
Payer: MEDICARE

## 2025-06-20 LAB
ALBUMIN SERPL-MCNC: 4.1 G/DL (ref 3.5–5.2)
ALP SERPL-CCNC: 97 U/L (ref 35–104)
ALT SERPL-CCNC: 33 U/L (ref 0–35)
ANION GAP SERPL CALCULATED.3IONS-SCNC: 12 MMOL/L (ref 7–16)
AST SERPL-CCNC: 31 U/L (ref 0–35)
BASOPHILS # BLD: 0.04 K/UL (ref 0–0.2)
BASOPHILS NFR BLD: 1 % (ref 0–2)
BILIRUB SERPL-MCNC: 0.4 MG/DL (ref 0–1.2)
BUN SERPL-MCNC: 26 MG/DL (ref 8–23)
CALCIUM SERPL-MCNC: 9.6 MG/DL (ref 8.8–10.2)
CHLORIDE SERPL-SCNC: 103 MMOL/L (ref 98–107)
CHOLEST SERPL-MCNC: 237 MG/DL
CO2 SERPL-SCNC: 29 MMOL/L (ref 22–29)
CREAT SERPL-MCNC: 1.1 MG/DL (ref 0.5–1)
CREAT UR-MCNC: 89 MG/DL (ref 29–226)
EOSINOPHIL # BLD: 0.3 K/UL (ref 0.05–0.5)
EOSINOPHILS RELATIVE PERCENT: 4 % (ref 0–6)
ERYTHROCYTE [DISTWIDTH] IN BLOOD BY AUTOMATED COUNT: 13.2 % (ref 11.5–15)
GFR, ESTIMATED: 53 ML/MIN/1.73M2
GLUCOSE SERPL-MCNC: 102 MG/DL (ref 74–99)
HBA1C MFR BLD: 8.1 % (ref 4–5.6)
HCT VFR BLD AUTO: 42.9 % (ref 34–48)
HDLC SERPL-MCNC: 62 MG/DL
HGB BLD-MCNC: 14.4 G/DL (ref 11.5–15.5)
IMM GRANULOCYTES # BLD AUTO: <0.03 K/UL (ref 0–0.58)
IMM GRANULOCYTES NFR BLD: 0 % (ref 0–5)
LDLC SERPL CALC-MCNC: 149 MG/DL
LYMPHOCYTES NFR BLD: 3.02 K/UL (ref 1.5–4)
LYMPHOCYTES RELATIVE PERCENT: 35 % (ref 20–42)
MCH RBC QN AUTO: 30.1 PG (ref 26–35)
MCHC RBC AUTO-ENTMCNC: 33.6 G/DL (ref 32–34.5)
MCV RBC AUTO: 89.7 FL (ref 80–99.9)
MICROALBUMIN UR-MCNC: 30 MG/L (ref 0–20)
MICROALBUMIN/CREAT UR-RTO: 34 MCG/MG CREAT (ref 0–30)
MONOCYTES NFR BLD: 0.59 K/UL (ref 0.1–0.95)
MONOCYTES NFR BLD: 7 % (ref 2–12)
NEUTROPHILS NFR BLD: 54 % (ref 43–80)
NEUTS SEG NFR BLD: 4.72 K/UL (ref 1.8–7.3)
PLATELET # BLD AUTO: 176 K/UL (ref 130–450)
PMV BLD AUTO: 9 FL (ref 7–12)
POTASSIUM SERPL-SCNC: 3.8 MMOL/L (ref 3.5–5.1)
PROT SERPL-MCNC: 6.6 G/DL (ref 6.4–8.3)
RBC # BLD AUTO: 4.78 M/UL (ref 3.5–5.5)
SODIUM SERPL-SCNC: 144 MMOL/L (ref 136–145)
T4 FREE SERPL-MCNC: 1.4 NG/DL (ref 0.9–1.7)
TRIGL SERPL-MCNC: 128 MG/DL
TSH SERPL DL<=0.05 MIU/L-ACNC: 0.67 UIU/ML (ref 0.27–4.2)
VLDLC SERPL CALC-MCNC: 26 MG/DL
WBC OTHER # BLD: 8.7 K/UL (ref 4.5–11.5)

## 2025-06-20 PROCEDURE — 85025 COMPLETE CBC W/AUTO DIFF WBC: CPT

## 2025-06-20 PROCEDURE — 80053 COMPREHEN METABOLIC PANEL: CPT

## 2025-06-20 PROCEDURE — 83036 HEMOGLOBIN GLYCOSYLATED A1C: CPT

## 2025-06-20 PROCEDURE — 80061 LIPID PANEL: CPT

## 2025-06-20 PROCEDURE — 82043 UR ALBUMIN QUANTITATIVE: CPT

## 2025-06-20 PROCEDURE — 36415 COLL VENOUS BLD VENIPUNCTURE: CPT

## 2025-06-20 PROCEDURE — 82570 ASSAY OF URINE CREATININE: CPT

## 2025-06-20 PROCEDURE — 84439 ASSAY OF FREE THYROXINE: CPT

## 2025-06-20 PROCEDURE — 82652 VIT D 1 25-DIHYDROXY: CPT

## 2025-06-20 PROCEDURE — 84443 ASSAY THYROID STIM HORMONE: CPT

## 2025-06-22 LAB — 1,25(OH)2D3 SERPL-MCNC: 30.2 PG/ML (ref 19.9–79.3)

## 2025-07-02 RX ORDER — RAMIPRIL 10 MG/1
CAPSULE ORAL DAILY
Qty: 90 CAPSULE | Refills: 3 | Status: SHIPPED | OUTPATIENT
Start: 2025-07-02

## (undated) DEVICE — CLAMP TONSIL

## (undated) DEVICE — GOWN,SIRUS,FABRNF,XL,20/CS: Brand: MEDLINE

## (undated) DEVICE — DRAPE,LAPAROTOMY,PCH,STERILE: Brand: MEDLINE

## (undated) DEVICE — APPLICATOR PREP 26ML 0.7% IOD POVACRYLEX 74% ISO ALC ST

## (undated) DEVICE — RETRACTOR WEITLANER BLUNT

## (undated) DEVICE — NEEDLE HYPO 25GA L1.5IN BLU POLYPR HUB S STL REG BVL STR

## (undated) DEVICE — PACK PROCEDURE SURG GEN CUST

## (undated) DEVICE — NEEDLE FLTR 18GA L1.5IN MEM THK5UM BLNT DISP

## (undated) DEVICE — TOWEL,OR,DSP,ST,BLUE,STD,6/PK,12PK/CS: Brand: MEDLINE

## (undated) DEVICE — SPONGE,LAP,4"X18",XR,ST,5/PK,40PK/CS: Brand: MEDLINE INDUSTRIES, INC.

## (undated) DEVICE — GLOVE ORANGE PI 8   MSG9080

## (undated) DEVICE — INTENDED FOR TISSUE SEPARATION, AND OTHER PROCEDURES THAT REQUIRE A SHARP SURGICAL BLADE TO PUNCTURE OR CUT.: Brand: BARD-PARKER ® STAINLESS STEEL BLADES

## (undated) DEVICE — STANDARD HYPODERMIC NEEDLE,POLYPROPYLENE HUB: Brand: MONOJECT

## (undated) DEVICE — ELECTRODE PT RET AD L9FT HI MOIST COND ADH HYDRGEL CORDED

## (undated) DEVICE — SOLUTION IV IRRIG POUR BRL 0.9% SODIUM CHL 2F7124

## (undated) DEVICE — DOUBLE BASIN SET: Brand: MEDLINE INDUSTRIES, INC.

## (undated) DEVICE — BLADE ES ELASTOMERIC COAT INSUL DURABLE BEND UPTO 90DEG